# Patient Record
Sex: MALE | Race: ASIAN | NOT HISPANIC OR LATINO | URBAN - METROPOLITAN AREA
[De-identification: names, ages, dates, MRNs, and addresses within clinical notes are randomized per-mention and may not be internally consistent; named-entity substitution may affect disease eponyms.]

---

## 2018-09-06 ENCOUNTER — EMERGENCY (EMERGENCY)
Facility: HOSPITAL | Age: 23
LOS: 1 days | Discharge: HOME | End: 2018-09-06
Attending: EMERGENCY MEDICINE | Admitting: EMERGENCY MEDICINE
Payer: MEDICAID

## 2018-09-06 VITALS
RESPIRATION RATE: 18 BRPM | SYSTOLIC BLOOD PRESSURE: 137 MMHG | HEART RATE: 89 BPM | TEMPERATURE: 98 F | DIASTOLIC BLOOD PRESSURE: 67 MMHG

## 2018-09-06 PROCEDURE — 99283 EMERGENCY DEPT VISIT LOW MDM: CPT

## 2018-09-06 RX ORDER — DIPHENHYDRAMINE HCL 50 MG
50 CAPSULE ORAL ONCE
Qty: 0 | Refills: 0 | Status: COMPLETED | OUTPATIENT
Start: 2018-09-06 | End: 2018-09-06

## 2018-09-06 RX ORDER — DIPHENHYDRAMINE HCL 50 MG
1 CAPSULE ORAL
Qty: 15 | Refills: 0 | OUTPATIENT
Start: 2018-09-06 | End: 2018-09-10

## 2018-09-06 RX ORDER — DIPHENHYDRAMINE HCL 50 MG
1 CAPSULE ORAL
Qty: 15 | Refills: 0
Start: 2018-09-06 | End: 2018-09-10

## 2018-09-06 RX ADMIN — Medication 40 MILLIGRAM(S): at 14:32

## 2018-09-06 RX ADMIN — Medication 50 MILLIGRAM(S): at 14:32

## 2018-09-06 NOTE — ED PROVIDER NOTE - OBJECTIVE STATEMENT
23 yr old male with no sig PMH presents with hives.  Described as pruritic.  Started yesterday.  Denies throat swelling.

## 2018-09-06 NOTE — ED PROVIDER NOTE - MEDICAL DECISION MAKING DETAILS
no throat manifestations; slightly improved on benadryl and steroids; will send on same; offending agent not clear from hx

## 2020-10-30 NOTE — ED ADULT TRIAGE NOTE - HEART RATE (BEATS/MIN)
Patient Seen in: [facility]      History   Patient presents with:  Cough    Stated Complaint: RX REFILL    HPI  Patient presents stating he has been working outside building a house and he is having a mild cough.   No chest pain or shortness o Mouth/Throat:      Mouth: Mucous membranes are moist.      Pharynx: Oropharynx is clear. Eyes:      Conjunctiva/sclera: Conjunctivae normal.   Neck:      Musculoskeletal: Normal range of motion and neck supple.    Cardiovascular:      Rate and Rhythm: N Base) MCG/ACT Inhalation Aero Soln  Inhale 2 puffs into the lungs every 4 (four) hours as needed for Wheezing. Use with your spacer  Qty: 1 Inhaler Refills: 0  Comments: Please dispense with a spacer and teaching if patient does not have a spacer.     benzo 89

## 2023-02-13 ENCOUNTER — INPATIENT (INPATIENT)
Facility: HOSPITAL | Age: 28
LOS: 1 days | Discharge: HOME | End: 2023-02-15
Attending: HOSPITALIST | Admitting: HOSPITALIST
Payer: MEDICAID

## 2023-02-13 VITALS
DIASTOLIC BLOOD PRESSURE: 99 MMHG | OXYGEN SATURATION: 100 % | RESPIRATION RATE: 18 BRPM | SYSTOLIC BLOOD PRESSURE: 141 MMHG | TEMPERATURE: 98 F | HEART RATE: 89 BPM

## 2023-02-13 DIAGNOSIS — Z29.9 ENCOUNTER FOR PROPHYLACTIC MEASURES, UNSPECIFIED: ICD-10-CM

## 2023-02-13 DIAGNOSIS — K85.90 ACUTE PANCREATITIS WITHOUT NECROSIS OR INFECTION, UNSPECIFIED: ICD-10-CM

## 2023-02-13 DIAGNOSIS — D72.829 ELEVATED WHITE BLOOD CELL COUNT, UNSPECIFIED: ICD-10-CM

## 2023-02-13 LAB
ALBUMIN SERPL ELPH-MCNC: 3.8 G/DL — SIGNIFICANT CHANGE UP (ref 3.3–5)
ALBUMIN SERPL ELPH-MCNC: 4.6 G/DL — SIGNIFICANT CHANGE UP (ref 3.3–5)
ALP SERPL-CCNC: 69 U/L — SIGNIFICANT CHANGE UP (ref 40–120)
ALP SERPL-CCNC: 81 U/L — SIGNIFICANT CHANGE UP (ref 40–120)
ALT FLD-CCNC: 31 U/L — SIGNIFICANT CHANGE UP (ref 4–41)
ALT FLD-CCNC: 39 U/L — SIGNIFICANT CHANGE UP (ref 4–41)
ANION GAP SERPL CALC-SCNC: 12 MMOL/L — SIGNIFICANT CHANGE UP (ref 7–14)
ANION GAP SERPL CALC-SCNC: 9 MMOL/L — SIGNIFICANT CHANGE UP (ref 7–14)
AST SERPL-CCNC: 17 U/L — SIGNIFICANT CHANGE UP (ref 4–40)
AST SERPL-CCNC: 20 U/L — SIGNIFICANT CHANGE UP (ref 4–40)
BASE EXCESS BLDV CALC-SCNC: 4.3 MMOL/L — HIGH (ref -2–3)
BASOPHILS # BLD AUTO: 0.04 K/UL — SIGNIFICANT CHANGE UP (ref 0–0.2)
BASOPHILS # BLD AUTO: 0.04 K/UL — SIGNIFICANT CHANGE UP (ref 0–0.2)
BASOPHILS NFR BLD AUTO: 0.3 % — SIGNIFICANT CHANGE UP (ref 0–2)
BASOPHILS NFR BLD AUTO: 0.3 % — SIGNIFICANT CHANGE UP (ref 0–2)
BILIRUB SERPL-MCNC: 0.7 MG/DL — SIGNIFICANT CHANGE UP (ref 0.2–1.2)
BILIRUB SERPL-MCNC: 0.7 MG/DL — SIGNIFICANT CHANGE UP (ref 0.2–1.2)
BLOOD GAS VENOUS COMPREHENSIVE RESULT: SIGNIFICANT CHANGE UP
BUN SERPL-MCNC: 11 MG/DL — SIGNIFICANT CHANGE UP (ref 7–23)
BUN SERPL-MCNC: 9 MG/DL — SIGNIFICANT CHANGE UP (ref 7–23)
CALCIUM SERPL-MCNC: 8.5 MG/DL — SIGNIFICANT CHANGE UP (ref 8.4–10.5)
CALCIUM SERPL-MCNC: 9.6 MG/DL — SIGNIFICANT CHANGE UP (ref 8.4–10.5)
CHLORIDE BLDV-SCNC: 100 MMOL/L — SIGNIFICANT CHANGE UP (ref 96–108)
CHLORIDE SERPL-SCNC: 101 MMOL/L — SIGNIFICANT CHANGE UP (ref 98–107)
CHLORIDE SERPL-SCNC: 97 MMOL/L — LOW (ref 98–107)
CO2 BLDV-SCNC: 30.5 MMOL/L — HIGH (ref 22–26)
CO2 SERPL-SCNC: 25 MMOL/L — SIGNIFICANT CHANGE UP (ref 22–31)
CO2 SERPL-SCNC: 26 MMOL/L — SIGNIFICANT CHANGE UP (ref 22–31)
CREAT SERPL-MCNC: 0.79 MG/DL — SIGNIFICANT CHANGE UP (ref 0.5–1.3)
CREAT SERPL-MCNC: 0.91 MG/DL — SIGNIFICANT CHANGE UP (ref 0.5–1.3)
EGFR: 118 ML/MIN/1.73M2 — SIGNIFICANT CHANGE UP
EGFR: 124 ML/MIN/1.73M2 — SIGNIFICANT CHANGE UP
EOSINOPHIL # BLD AUTO: 0.01 K/UL — SIGNIFICANT CHANGE UP (ref 0–0.5)
EOSINOPHIL # BLD AUTO: 0.03 K/UL — SIGNIFICANT CHANGE UP (ref 0–0.5)
EOSINOPHIL NFR BLD AUTO: 0.1 % — SIGNIFICANT CHANGE UP (ref 0–6)
EOSINOPHIL NFR BLD AUTO: 0.2 % — SIGNIFICANT CHANGE UP (ref 0–6)
FLUAV AG NPH QL: SIGNIFICANT CHANGE UP
FLUBV AG NPH QL: SIGNIFICANT CHANGE UP
GAS PNL BLDV: 134 MMOL/L — LOW (ref 136–145)
GAS PNL BLDV: SIGNIFICANT CHANGE UP
GLUCOSE BLDV-MCNC: 148 MG/DL — HIGH (ref 70–99)
GLUCOSE SERPL-MCNC: 137 MG/DL — HIGH (ref 70–99)
GLUCOSE SERPL-MCNC: 145 MG/DL — HIGH (ref 70–99)
HCO3 BLDV-SCNC: 29 MMOL/L — SIGNIFICANT CHANGE UP (ref 22–29)
HCT VFR BLD CALC: 44.8 % — SIGNIFICANT CHANGE UP (ref 39–50)
HCT VFR BLD CALC: 49.2 % — SIGNIFICANT CHANGE UP (ref 39–50)
HCT VFR BLDA CALC: 51 % — SIGNIFICANT CHANGE UP (ref 39–51)
HGB BLD CALC-MCNC: 16.9 G/DL — SIGNIFICANT CHANGE UP (ref 12.6–17.4)
HGB BLD-MCNC: 14.9 G/DL — SIGNIFICANT CHANGE UP (ref 13–17)
HGB BLD-MCNC: 16.6 G/DL — SIGNIFICANT CHANGE UP (ref 13–17)
IANC: 10 K/UL — HIGH (ref 1.8–7.4)
IANC: 9.59 K/UL — HIGH (ref 1.8–7.4)
IMM GRANULOCYTES NFR BLD AUTO: 0.4 % — SIGNIFICANT CHANGE UP (ref 0–0.9)
IMM GRANULOCYTES NFR BLD AUTO: 0.4 % — SIGNIFICANT CHANGE UP (ref 0–0.9)
LACTATE BLDV-MCNC: 1.9 MMOL/L — SIGNIFICANT CHANGE UP (ref 0.5–2)
LIDOCAIN IGE QN: >3000 U/L — HIGH (ref 7–60)
LYMPHOCYTES # BLD AUTO: 1.5 K/UL — SIGNIFICANT CHANGE UP (ref 1–3.3)
LYMPHOCYTES # BLD AUTO: 12.1 % — LOW (ref 13–44)
LYMPHOCYTES # BLD AUTO: 18 % — SIGNIFICANT CHANGE UP (ref 13–44)
LYMPHOCYTES # BLD AUTO: 2.29 K/UL — SIGNIFICANT CHANGE UP (ref 1–3.3)
MAGNESIUM SERPL-MCNC: 1.6 MG/DL — SIGNIFICANT CHANGE UP (ref 1.6–2.6)
MCHC RBC-ENTMCNC: 29 PG — SIGNIFICANT CHANGE UP (ref 27–34)
MCHC RBC-ENTMCNC: 29.3 PG — SIGNIFICANT CHANGE UP (ref 27–34)
MCHC RBC-ENTMCNC: 33.3 GM/DL — SIGNIFICANT CHANGE UP (ref 32–36)
MCHC RBC-ENTMCNC: 33.7 GM/DL — SIGNIFICANT CHANGE UP (ref 32–36)
MCV RBC AUTO: 86.9 FL — SIGNIFICANT CHANGE UP (ref 80–100)
MCV RBC AUTO: 87.2 FL — SIGNIFICANT CHANGE UP (ref 80–100)
MONOCYTES # BLD AUTO: 0.69 K/UL — SIGNIFICANT CHANGE UP (ref 0–0.9)
MONOCYTES # BLD AUTO: 0.82 K/UL — SIGNIFICANT CHANGE UP (ref 0–0.9)
MONOCYTES NFR BLD AUTO: 5.4 % — SIGNIFICANT CHANGE UP (ref 2–14)
MONOCYTES NFR BLD AUTO: 6.6 % — SIGNIFICANT CHANGE UP (ref 2–14)
NEUTROPHILS # BLD AUTO: 10 K/UL — HIGH (ref 1.8–7.4)
NEUTROPHILS # BLD AUTO: 9.59 K/UL — HIGH (ref 1.8–7.4)
NEUTROPHILS NFR BLD AUTO: 75.7 % — SIGNIFICANT CHANGE UP (ref 43–77)
NEUTROPHILS NFR BLD AUTO: 80.5 % — HIGH (ref 43–77)
NRBC # BLD: 0 /100 WBCS — SIGNIFICANT CHANGE UP (ref 0–0)
NRBC # BLD: 0 /100 WBCS — SIGNIFICANT CHANGE UP (ref 0–0)
NRBC # FLD: 0 K/UL — SIGNIFICANT CHANGE UP (ref 0–0)
NRBC # FLD: 0 K/UL — SIGNIFICANT CHANGE UP (ref 0–0)
PCO2 BLDV: 43 MMHG — SIGNIFICANT CHANGE UP (ref 42–55)
PH BLDV: 7.44 — HIGH (ref 7.32–7.43)
PHOSPHATE SERPL-MCNC: 3.2 MG/DL — SIGNIFICANT CHANGE UP (ref 2.5–4.5)
PLATELET # BLD AUTO: 346 K/UL — SIGNIFICANT CHANGE UP (ref 150–400)
PLATELET # BLD AUTO: 384 K/UL — SIGNIFICANT CHANGE UP (ref 150–400)
PO2 BLDV: 26 MMHG — SIGNIFICANT CHANGE UP (ref 25–45)
POTASSIUM BLDV-SCNC: 3.8 MMOL/L — SIGNIFICANT CHANGE UP (ref 3.5–5.1)
POTASSIUM SERPL-MCNC: 3.8 MMOL/L — SIGNIFICANT CHANGE UP (ref 3.5–5.3)
POTASSIUM SERPL-MCNC: 3.8 MMOL/L — SIGNIFICANT CHANGE UP (ref 3.5–5.3)
POTASSIUM SERPL-SCNC: 3.8 MMOL/L — SIGNIFICANT CHANGE UP (ref 3.5–5.3)
POTASSIUM SERPL-SCNC: 3.8 MMOL/L — SIGNIFICANT CHANGE UP (ref 3.5–5.3)
PROT SERPL-MCNC: 6.7 G/DL — SIGNIFICANT CHANGE UP (ref 6–8.3)
PROT SERPL-MCNC: 7.9 G/DL — SIGNIFICANT CHANGE UP (ref 6–8.3)
RBC # BLD: 5.14 M/UL — SIGNIFICANT CHANGE UP (ref 4.2–5.8)
RBC # BLD: 5.66 M/UL — SIGNIFICANT CHANGE UP (ref 4.2–5.8)
RBC # FLD: 12.6 % — SIGNIFICANT CHANGE UP (ref 10.3–14.5)
RBC # FLD: 12.9 % — SIGNIFICANT CHANGE UP (ref 10.3–14.5)
RSV RNA NPH QL NAA+NON-PROBE: SIGNIFICANT CHANGE UP
SAO2 % BLDV: 40.4 % — LOW (ref 67–88)
SARS-COV-2 RNA SPEC QL NAA+PROBE: DETECTED
SODIUM SERPL-SCNC: 135 MMOL/L — SIGNIFICANT CHANGE UP (ref 135–145)
SODIUM SERPL-SCNC: 135 MMOL/L — SIGNIFICANT CHANGE UP (ref 135–145)
WBC # BLD: 12.42 K/UL — HIGH (ref 3.8–10.5)
WBC # BLD: 12.69 K/UL — HIGH (ref 3.8–10.5)
WBC # FLD AUTO: 12.42 K/UL — HIGH (ref 3.8–10.5)
WBC # FLD AUTO: 12.69 K/UL — HIGH (ref 3.8–10.5)

## 2023-02-13 PROCEDURE — 12345: CPT | Mod: NC

## 2023-02-13 PROCEDURE — 76705 ECHO EXAM OF ABDOMEN: CPT | Mod: 26

## 2023-02-13 PROCEDURE — 99222 1ST HOSP IP/OBS MODERATE 55: CPT

## 2023-02-13 PROCEDURE — 99285 EMERGENCY DEPT VISIT HI MDM: CPT

## 2023-02-13 PROCEDURE — 99223 1ST HOSP IP/OBS HIGH 75: CPT

## 2023-02-13 PROCEDURE — 74177 CT ABD & PELVIS W/CONTRAST: CPT | Mod: 26

## 2023-02-13 RX ORDER — SODIUM CHLORIDE 9 MG/ML
1000 INJECTION, SOLUTION INTRAVENOUS
Refills: 0 | Status: DISCONTINUED | OUTPATIENT
Start: 2023-02-13 | End: 2023-02-15

## 2023-02-13 RX ORDER — LIDOCAINE 4 G/100G
10 CREAM TOPICAL ONCE
Refills: 0 | Status: COMPLETED | OUTPATIENT
Start: 2023-02-13 | End: 2023-02-13

## 2023-02-13 RX ORDER — SODIUM CHLORIDE 9 MG/ML
1000 INJECTION INTRAMUSCULAR; INTRAVENOUS; SUBCUTANEOUS ONCE
Refills: 0 | Status: COMPLETED | OUTPATIENT
Start: 2023-02-13 | End: 2023-02-13

## 2023-02-13 RX ORDER — FAMOTIDINE 10 MG/ML
20 INJECTION INTRAVENOUS ONCE
Refills: 0 | Status: COMPLETED | OUTPATIENT
Start: 2023-02-13 | End: 2023-02-13

## 2023-02-13 RX ORDER — ONDANSETRON 8 MG/1
4 TABLET, FILM COATED ORAL ONCE
Refills: 0 | Status: COMPLETED | OUTPATIENT
Start: 2023-02-13 | End: 2023-02-13

## 2023-02-13 RX ORDER — HYDROMORPHONE HYDROCHLORIDE 2 MG/ML
1 INJECTION INTRAMUSCULAR; INTRAVENOUS; SUBCUTANEOUS EVERY 4 HOURS
Refills: 0 | Status: DISCONTINUED | OUTPATIENT
Start: 2023-02-13 | End: 2023-02-15

## 2023-02-13 RX ORDER — ACETAMINOPHEN 500 MG
1000 TABLET ORAL ONCE
Refills: 0 | Status: COMPLETED | OUTPATIENT
Start: 2023-02-13 | End: 2023-02-13

## 2023-02-13 RX ORDER — HYDROMORPHONE HYDROCHLORIDE 2 MG/ML
1 INJECTION INTRAMUSCULAR; INTRAVENOUS; SUBCUTANEOUS ONCE
Refills: 0 | Status: DISCONTINUED | OUTPATIENT
Start: 2023-02-13 | End: 2023-02-13

## 2023-02-13 RX ORDER — MAGNESIUM SULFATE 500 MG/ML
2 VIAL (ML) INJECTION ONCE
Refills: 0 | Status: COMPLETED | OUTPATIENT
Start: 2023-02-13 | End: 2023-02-13

## 2023-02-13 RX ORDER — MORPHINE SULFATE 50 MG/1
4 CAPSULE, EXTENDED RELEASE ORAL ONCE
Refills: 0 | Status: DISCONTINUED | OUTPATIENT
Start: 2023-02-13 | End: 2023-02-13

## 2023-02-13 RX ORDER — HYDROMORPHONE HYDROCHLORIDE 2 MG/ML
2 INJECTION INTRAMUSCULAR; INTRAVENOUS; SUBCUTANEOUS EVERY 4 HOURS
Refills: 0 | Status: DISCONTINUED | OUTPATIENT
Start: 2023-02-13 | End: 2023-02-15

## 2023-02-13 RX ADMIN — HYDROMORPHONE HYDROCHLORIDE 2 MILLIGRAM(S): 2 INJECTION INTRAMUSCULAR; INTRAVENOUS; SUBCUTANEOUS at 10:19

## 2023-02-13 RX ADMIN — MORPHINE SULFATE 4 MILLIGRAM(S): 50 CAPSULE, EXTENDED RELEASE ORAL at 02:14

## 2023-02-13 RX ADMIN — SODIUM CHLORIDE 150 MILLILITER(S): 9 INJECTION, SOLUTION INTRAVENOUS at 05:11

## 2023-02-13 RX ADMIN — MORPHINE SULFATE 4 MILLIGRAM(S): 50 CAPSULE, EXTENDED RELEASE ORAL at 04:05

## 2023-02-13 RX ADMIN — LIDOCAINE 10 MILLILITER(S): 4 CREAM TOPICAL at 05:35

## 2023-02-13 RX ADMIN — SODIUM CHLORIDE 1000 MILLILITER(S): 9 INJECTION INTRAMUSCULAR; INTRAVENOUS; SUBCUTANEOUS at 02:56

## 2023-02-13 RX ADMIN — SODIUM CHLORIDE 150 MILLILITER(S): 9 INJECTION, SOLUTION INTRAVENOUS at 10:26

## 2023-02-13 RX ADMIN — ONDANSETRON 4 MILLIGRAM(S): 8 TABLET, FILM COATED ORAL at 01:16

## 2023-02-13 RX ADMIN — Medication 400 MILLIGRAM(S): at 01:16

## 2023-02-13 RX ADMIN — HYDROMORPHONE HYDROCHLORIDE 2 MILLIGRAM(S): 2 INJECTION INTRAMUSCULAR; INTRAVENOUS; SUBCUTANEOUS at 23:01

## 2023-02-13 RX ADMIN — HYDROMORPHONE HYDROCHLORIDE 2 MILLIGRAM(S): 2 INJECTION INTRAMUSCULAR; INTRAVENOUS; SUBCUTANEOUS at 16:08

## 2023-02-13 RX ADMIN — SODIUM CHLORIDE 1000 MILLILITER(S): 9 INJECTION INTRAMUSCULAR; INTRAVENOUS; SUBCUTANEOUS at 01:16

## 2023-02-13 RX ADMIN — HYDROMORPHONE HYDROCHLORIDE 2 MILLIGRAM(S): 2 INJECTION INTRAMUSCULAR; INTRAVENOUS; SUBCUTANEOUS at 15:51

## 2023-02-13 RX ADMIN — Medication 25 GRAM(S): at 10:24

## 2023-02-13 RX ADMIN — HYDROMORPHONE HYDROCHLORIDE 1 MILLIGRAM(S): 2 INJECTION INTRAMUSCULAR; INTRAVENOUS; SUBCUTANEOUS at 05:30

## 2023-02-13 RX ADMIN — HYDROMORPHONE HYDROCHLORIDE 1 MILLIGRAM(S): 2 INJECTION INTRAMUSCULAR; INTRAVENOUS; SUBCUTANEOUS at 05:11

## 2023-02-13 RX ADMIN — HYDROMORPHONE HYDROCHLORIDE 2 MILLIGRAM(S): 2 INJECTION INTRAMUSCULAR; INTRAVENOUS; SUBCUTANEOUS at 23:40

## 2023-02-13 RX ADMIN — FAMOTIDINE 20 MILLIGRAM(S): 10 INJECTION INTRAVENOUS at 01:16

## 2023-02-13 RX ADMIN — HYDROMORPHONE HYDROCHLORIDE 2 MILLIGRAM(S): 2 INJECTION INTRAMUSCULAR; INTRAVENOUS; SUBCUTANEOUS at 10:34

## 2023-02-13 NOTE — PROGRESS NOTE ADULT - SUBJECTIVE AND OBJECTIVE BOX
Patient is a 28y old  Male who presents with a chief complaint of Pancreatitis (13 Feb 2023 08:08)      SUBJECTIVE / OVERNIGHT EVENTS:    MEDICATIONS  (STANDING):  lactated ringers. 1000 milliLiter(s) (150 mL/Hr) IV Continuous <Continuous>  magnesium sulfate  IVPB 2 Gram(s) IV Intermittent once    MEDICATIONS  (PRN):  HYDROmorphone  Injectable 1 milliGRAM(s) IV Push every 4 hours PRN Moderate Pain (4 - 6)  HYDROmorphone  Injectable 2 milliGRAM(s) IV Push every 4 hours PRN Severe Pain (7 - 10)      Vital Signs Last 24 Hrs  T(C): 36.9 (13 Feb 2023 06:38), Max: 36.9 (13 Feb 2023 06:38)  T(F): 98.5 (13 Feb 2023 06:38), Max: 98.5 (13 Feb 2023 06:38)  HR: 65 (13 Feb 2023 06:38) (65 - 89)  BP: 126/86 (13 Feb 2023 06:38) (126/86 - 141/99)  BP(mean): --  RR: 16 (13 Feb 2023 06:38) (15 - 18)  SpO2: 98% (13 Feb 2023 06:38) (98% - 100%)    Parameters below as of 13 Feb 2023 06:38  Patient On (Oxygen Delivery Method): room air      CAPILLARY BLOOD GLUCOSE        I&O's Summary      PHYSICAL EXAM:  GENERAL: NAD, well-developed  HEAD:  Atraumatic, Normocephalic  EYES: EOMI, PERRLA, conjunctiva and sclera clear  NECK: Supple, No JVD  CHEST/LUNG: Clear to auscultation bilaterally; No wheeze  HEART: Regular rate and rhythm; No murmurs, rubs, or gallops  ABDOMEN: Soft, Nontender, Nondistended; Bowel sounds present  EXTREMITIES:  2+ Peripheral Pulses, No clubbing, cyanosis, or edema  PSYCH: AAOx3  NEUROLOGY: non-focal  SKIN: No rashes or lesions    LABS:                        14.9   12.42 )-----------( 346      ( 13 Feb 2023 05:50 )             44.8     02-13    135  |  101  |  9   ----------------------------<  137<H>  3.8   |  25  |  0.79    Ca    8.5      13 Feb 2023 05:50  Phos  3.2     02-13  Mg     1.60     02-13    TPro  6.7  /  Alb  3.8  /  TBili  0.7  /  DBili  x   /  AST  17  /  ALT  31  /  AlkPhos  69  02-13              RADIOLOGY & ADDITIONAL TESTS:    Imaging Personally Reviewed:    Consultant(s) Notes Reviewed:      Care Discussed with Consultants/Other Providers:

## 2023-02-13 NOTE — H&P ADULT - NSHPPHYSICALEXAM_GEN_ALL_CORE
Vital Signs Last 24 Hrs  T(C): 36.7 (13 Feb 2023 00:18), Max: 36.7 (13 Feb 2023 00:18)  T(F): 98 (13 Feb 2023 00:18), Max: 98 (13 Feb 2023 00:18)  HR: 87 (13 Feb 2023 03:24) (87 - 89)  BP: 139/89 (13 Feb 2023 03:24) (139/89 - 141/99)  BP(mean): --  RR: 15 (13 Feb 2023 03:24) (15 - 18)  SpO2: 100% (13 Feb 2023 03:24) (100% - 100%)    Parameters below as of 13 Feb 2023 03:24  Patient On (Oxygen Delivery Method): room air        CONSTITUTIONAL: Uncomfortable; holding ABD   HEENT: clear conjunctiva  RESPIRATORY: Normal respiratory effort; lungs are clear to auscultation bilaterally; No Crackles/Rhonchi/Wheezing  CARDIOVASCULAR: Regular rate and rhythm, normal S1 and S2, no murmur/rub/gallop; No lower extremity edema; Peripheral pulses are 2+ bilaterally  ABDOMEN: Tender to Palpation to epigastric area; normoactive bowel sounds, no rebound/guarding;   MUSCULOSKELETAL: no clubbing or cyanosis of digits; no joint swelling or tenderness to palpation  EXTREMITY: Lower extremities Non-tender to palpation; non-erythematous B/L  NEURO: A&Ox3; no focal deficits   PSYCH: normal mood; Affect appropirate

## 2023-02-13 NOTE — ED PROVIDER NOTE - CLINICAL SUMMARY MEDICAL DECISION MAKING FREE TEXT BOX
29 y/o M no reported pmhx p/w sudden onset epigastric pain x 4 hours PTA.   Gastritis vs pancreatitis  plan  - labs  - ivf  - pain control  - reassess

## 2023-02-13 NOTE — ED ADULT NURSE NOTE - OBJECTIVE STATEMENT
29y/o M who denies any PMHx presents to ED with c/o mid abdominal pain, nausea, and multiple episodes of NBNB emesis tonight after eating [employer]. Denies fevers, chills, urinary complaints, diarrhea, constipation, CP, SOB. IV access established. Labs collected and sent. Medicated & given IV fluids as per provider order.

## 2023-02-13 NOTE — PROGRESS NOTE ADULT - PROBLEM SELECTOR PLAN 1
Acute pancreatitis since 9pm today   Lipase > 3000  NBNB emesis x4 today   - IVF   - Pain control with Hydromorphone 1 PRN q4 for mod, Hydromorphone 2 PRN for severe   - Npo for now, advance as tolerable   - Anti-emetics PRN (Check QTc)  - F/U CT A/P   - F/U RUQ  - Consider Auto-immune Pancreatitis as this is his second episode of pancreatitis/similiar presentation as Dx: Will emai lGI c/s

## 2023-02-13 NOTE — H&P ADULT - PROBLEM SELECTOR PLAN 1
Acute pancreatitis since 9pm today   Lipase > 3000  NBNB emesis x4 today   - IVF   - Pain control with Hydromorphone 1 PRN q4 for severe, Hydromorphone 0.5 PRN q4 for mod   - Bowel regimen  - Npo for now, advance as tolerable   - Anti-emetics PRN (Check QTc)  - F/U CT A/P   - F/U RUQ Acute pancreatitis since 9pm today   Lipase > 3000  NBNB emesis x4 today   - IVF   - Pain control with Hydromorphone 1 PRN q4 for severe, Hydromorphone 0.5 PRN q4 for mod   - Npo for now, advance as tolerable   - Anti-emetics PRN (Check QTc)  - F/U CT A/P   - F/U RUQ Acute pancreatitis since 9pm today   Lipase > 3000  NBNB emesis x4 today   - IVF   - Pain control with Hydromorphone 1 PRN q4 for mod, Hydromorphone 2 PRN for severe   - Npo for now, advance as tolerable   - Anti-emetics PRN (Check QTc)  - F/U CT A/P   - F/U RUQ  - Consider Auto-immune Pancreatitis as Dx: Will emai lGI c/s Acute pancreatitis since 9pm today   Lipase > 3000  NBNB emesis x4 today   - IVF   - Pain control with Hydromorphone 1 PRN q4 for mod, Hydromorphone 2 PRN for severe   - Npo for now, advance as tolerable   - Anti-emetics PRN (Check QTc)  - F/U CT A/P   - F/U RUQ  - Consider Auto-immune Pancreatitis as this is his second episode of pancreatitis/similiar presentation as Dx: Will emai lGI c/s

## 2023-02-13 NOTE — ED ADULT NURSE REASSESSMENT NOTE - NS ED NURSE REASSESS COMMENT FT1
Pt admitted to medicine for pancreatitis. Pain managed as per provider order. CT scan pending. Safety maintained. No acute distress noted. Pt slotted for ESSU; report provided to ALLEN Shin.
Report given to ESSU 1 ALLEN Lee. NAD noted. respirations even and unlabored on RA. VS as noted.

## 2023-02-13 NOTE — H&P ADULT - NSHPREVIEWOFSYSTEMS_GEN_ALL_CORE
General: Denies dizziness, fatigue  Respiratory: Denies cough, SOB  Cardiovascular: Denies palpitations, CP  Gastrointestinal: + abd pain, +N/V, denies hematochezia, melena , diarrhea   : Denies dysuria,   Neuro: Denies weakness,     All ROS negative unless indicated above

## 2023-02-13 NOTE — ED PROVIDER NOTE - ATTENDING APP SHARED VISIT CONTRIBUTION OF CARE
27 yo male with no PMH for evaluation of epigastric pain since earlier today. +nausea with non-bloody, non-bilious vomiting. PE: abd +epigastric tender to palpation A/P Labs, imaging, medicate, reassess

## 2023-02-13 NOTE — CONSULT NOTE ADULT - NS ATTEND AMEND GEN_ALL_CORE FT
#Acute pancreatitis: Presented with abdominal pain with associated lipase elevation c/w acute pancreatitis. No significant EtOH reported. TG levels normal. US without biliary pathology.   #COVID infection    --Daily PPI  --Pain management per primary team  --NPO for now, can trial clear liquid diet when symptoms improve  --Send IgG4  --PEth testing    Additional recommendations as above.

## 2023-02-13 NOTE — ED PROVIDER NOTE - OBJECTIVE STATEMENT
29 y/o M no reported pmhx p/w sudden onset epigastric pain x 4 hours PTA. Pt reports severe sharp pain. No exacerbating or alleviating factors reports 4 episodes of emesis. Pain started after eating subway and has been worsening since. Pt denies fever, chills, chest pain, sob, cough, diarrhea, headache, dizziness.

## 2023-02-13 NOTE — H&P ADULT - HISTORY OF PRESENT ILLNESS
28M w/ no PMH p/w with acute epigastric abd pain since 9pm. Pt says he had a turkey sandwhich with pper-tyra cheese and chipotle sauce from [employer] and then began having severe ABD pain afterwards with approx 3-4 episodes of NBNB emesis. Pt says he rarely drinks (only socially). He says he has had this happen to him one time in the past at [place] state where he [place] hospitalized for similar presentation. Denies any family hx of hypretryglyciredemia.  28M w/ no PMH p/w with acute epigastric abd pain since 9pm. Pt says he had a turkey sandwhich with pper-tyra cheese and chipotle sauce from [employer] and then began having severe ABD pain afterwards with approx 3-4 episodes of NBNB emesis. Pt says he rarely drinks (only socially). He says he has had this happen to him one time in the past at another state where he was hospitalized for similar presentation. Denies any family hx of hypertriglyceridemia Denies recent fevers, chills, cp, urinary symptoms, diarrhea, constipation, melena, hematochezia, hematemesis     ED: 139/89, HR 89, Afebrile , RA   Received: Morphine 4IVP x2, Pepcid x1, Zofran x1, 2L, placed on LR maintanence

## 2023-02-13 NOTE — H&P ADULT - ATTENDING COMMENTS
I agree with the above H&P from ACP/Resident/Intern. I have personally seen and examined the patient.  I fully participated in the care of this patient.  I have made amendments to the documentation where necessary, and agree with the history, physical exam, and plan as documented by the Resident.  In addition:  HPI: 28M w/ no PMH p/w with acute epigastric abdominal pain since 9pm. Pt says he had a turkey sandwich with pepper-tyra cheese and chipotle sauce from [employer] and then began having severe ABD pain afterwards with approx 3-4 episodes of NBNB emesis. Pt says he rarely drinks (only socially) and has not had any alcohol prior to admission. He says he has had this happen to him one time in the past at [place] state where he was hospitalized for similar presentation but not pancreatitis. Denies any family hx of hypertriglyceridemia, pancreatitis, autoimmune disease. Denies recent fevers, chills, cp, urinary symptoms, diarrhea, constipation, melena, hematochezia, hematemesis     ED: 139/89, HR 89, Afebrile , RA   Received: Morphine 4IVP x2, Pepcid x1, Zofran x1, 2L, placed on LR maintenance. CT A/P pending. Triglyceride and labs otherwise normal.    Labs: Reviewed  Imaging: Reviewed  EKG: Reviewed  PHYSICAL EXAM:  GENERAL: NAD, comfortable appearing  CHEST/LUNG: Clear to auscultation bilaterally; No wheezes, rales or rhonchi  HEART: Regular rate and rhythm; No murmurs, rubs, or gallops, (+)S1, S2  ABDOMEN: Soft, Nontender, Nondistended; Normal Bowel sounds   EXTREMITIES:  2+ Peripheral Pulses, No clubbing, cyanosis, or edema    A/P: 28M w/ no PMH p/w with acute epigastric abdominal pain with elevated lipase now admitted for acute pancreatitis. Unknown/idiopathic pancreatitis concerning for autoimmune due to recurrence. Not hypercalcemic, poisoning, gallstones, etoh. Continue IVF, dilaudid, NPO. F/U CT A/P for biliary assessment.

## 2023-02-13 NOTE — H&P ADULT - NSHPLABSRESULTS_GEN_ALL_CORE
LABS:                        16.6   12.69 )-----------( 384      ( 13 Feb 2023 01:15 )             49.2     02-13    135  |  97<L>  |  11  ----------------------------<  145<H>  3.8   |  26  |  0.91    Ca    9.6      13 Feb 2023 01:15    TPro  7.9  /  Alb  4.6  /  TBili  0.7  /  DBili  x   /  AST  20  /  ALT  39  /  AlkPhos  81  02-13                RADIOLOGY & ADDITIONAL TESTS:  Results Reviewed:   Imaging Personally Reviewed:  Electrocardiogram Personally Reviewed: LABS:                        16.6   12.69 )-----------( 384      ( 13 Feb 2023 01:15 )             49.2     02-13    135  |  97<L>  |  11  ----------------------------<  145<H>  3.8   |  26  |  0.91    Ca    9.6      13 Feb 2023 01:15    TPro  7.9  /  Alb  4.6  /  TBili  0.7  /  DBili  x   /  AST  20  /  ALT  39  /  AlkPhos  81  02-13      RADIOLOGY & ADDITIONAL TESTS:  Results Reviewed:   Imaging Personally Reviewed:  Electrocardiogram Personally Reviewed:    EKG: Pending    Image: pending CT A/P

## 2023-02-13 NOTE — CONSULT NOTE ADULT - SUBJECTIVE AND OBJECTIVE BOX
Chief Complaint:  Patient is a 28y old  Male who presents with a chief complaint of Pancreatitis (13 Feb 2023 04:27)      HPI:  28M w/ no PMH p/w with acute epigastric abd pain since 9pm. Admitted for acute pancreatitis, GI consulted for further evaluation.    pt seen and examined.    currently avss  wbc 12k  renal fxn wnl  lfts wnl  lipase >3k  tg 59  us and ct pending      Allergies:  No Known Allergies      PMHX/PSHX:  No pertinent past medical history    No significant past surgical history        Family history:  No pertinent family history in first degree relatives        Social History: as above    Home Medications:    Hospital Medications:  HYDROmorphone  Injectable 1 milliGRAM(s) IV Push every 4 hours PRN  HYDROmorphone  Injectable 2 milliGRAM(s) IV Push every 4 hours PRN  lactated ringers. 1000 milliLiter(s) IV Continuous <Continuous>        ROS:   General:  AS PER HPI  Eyes:  Adequate vision, no reported pain  ENT:  No sore throat, runny nose, dysphagia  CV:  No chest pain, palpitations  Pulm:  No dyspnea, cough, tachypnea, wheezing  GI:  AS PER HPI  :  No pain, bleeding, burning  Muscle:  No pain, weakness  Neuro:  No tingling, numbness, memory problems  Psych:  No insomnia, mood problems, depression  Endocrine:  No polyuria, cold/heat intolerance  Heme:  No petechiae, ecchymosis, easy bruisability  Skin:  No rash, edema      Vital Signs:  Vital Signs Last 24 Hrs  T(C): 36.9 (13 Feb 2023 06:38), Max: 36.9 (13 Feb 2023 06:38)  T(F): 98.5 (13 Feb 2023 06:38), Max: 98.5 (13 Feb 2023 06:38)  HR: 65 (13 Feb 2023 06:38) (65 - 89)  BP: 126/86 (13 Feb 2023 06:38) (126/86 - 141/99)  BP(mean): --  RR: 16 (13 Feb 2023 06:38) (15 - 18)  SpO2: 98% (13 Feb 2023 06:38) (98% - 100%)    Parameters below as of 13 Feb 2023 06:38  Patient On (Oxygen Delivery Method): room air      Daily     Daily     LABS:                        14.9   12.42 )-----------( 346      ( 13 Feb 2023 05:50 )             44.8     Mean Cell Volume: 87.2 fL (02-13-23 @ 05:50)    02-13    135  |  101  |  9   ----------------------------<  137<H>  3.8   |  25  |  0.79    Ca    8.5      13 Feb 2023 05:50  Phos  3.2     02-13  Mg     1.60     02-13    TPro  6.7  /  Alb  3.8  /  TBili  0.7  /  DBili  x   /  AST  17  /  ALT  31  /  AlkPhos  69  02-13    LIVER FUNCTIONS - ( 13 Feb 2023 05:50 )  Alb: 3.8 g/dL / Pro: 6.7 g/dL / ALK PHOS: 69 U/L / ALT: 31 U/L / AST: 17 U/L / GGT: x               Amylase Serum--      Lipase serum>3000       Ammonia--                          14.9   12.42 )-----------( 346      ( 13 Feb 2023 05:50 )             44.8                         16.6   12.69 )-----------( 384      ( 13 Feb 2023 01:15 )             49.2       PHYSICAL EXAM:   GENERAL:  Lying in bed in NAD  HEENT:  Normocephalic/atraumatic, no scleral icterus  NECK: Trachea midline  CHEST:  Resp even, non labored   ABDOMEN:  Soft, non-tender, non-distended  EXTREMITIES: WWP, no edema  SKIN:  No rash or jaundice  NEURO:  Alert and oriented x 3, no asterixis    Imaging:  imaging pending         Chief Complaint:  Patient is a 28y old  Male who presents with a chief complaint of Pancreatitis (13 Feb 2023 04:27)      HPI:  28M w/ no PMH p/w with acute epigastric abd pain since 9pm. Admitted for acute pancreatitis, GI consulted for further evaluation.    pt seen and examined, brother present, assisted w/ hx. pt c/o severe non radiating upper abd pain which started last night around 9pm shortly after eating sandwich from [employer] (turkey, cheese, chipotle sauce). associated nausea and non bloody vomiting. last bm yesterday (no blood). upon eval, denies prior episodes of such pain, prior hospitalizations for abd pain or known prior pancreatitis (despite documentation from initial h&p). denies fevers, chills, hematemesis, diarrhea, constipation, melena, brbpr. has never had egd/colon. no prior abd sx. fhx nc for gi tract live, ibd, pancreatic disorders. meds- no daily meds. reports social etoh use ~2-3x/month, no use recently. upon eval, c/o ongoing severe abd pain.    currently avss  wbc 12k  renal fxn wnl  lfts wnl  lipase >3k  tg 59 ca wnl  us and ct pending      Allergies:  No Known Allergies      PMHX/PSHX:  No pertinent past medical history    No significant past surgical history        Family history:  No pertinent family history in first degree relatives        Social History: as above    Home Medications:    Hospital Medications:  HYDROmorphone  Injectable 1 milliGRAM(s) IV Push every 4 hours PRN  HYDROmorphone  Injectable 2 milliGRAM(s) IV Push every 4 hours PRN  lactated ringers. 1000 milliLiter(s) IV Continuous <Continuous>        ROS:   General:  AS PER HPI  Eyes:  Adequate vision, no reported pain  ENT:  No sore throat, runny nose, dysphagia  CV:  No chest pain, palpitations  Pulm:  No dyspnea, cough, tachypnea, wheezing  GI:  AS PER HPI  :  No pain, bleeding, burning  Muscle:  No pain, weakness  Neuro:  No tingling, numbness, memory problems  Psych:  No insomnia, mood problems, depression  Endocrine:  No polyuria, cold/heat intolerance  Heme:  No petechiae, ecchymosis, easy bruisability  Skin:  No rash, edema      Vital Signs:  Vital Signs Last 24 Hrs  T(C): 36.9 (13 Feb 2023 06:38), Max: 36.9 (13 Feb 2023 06:38)  T(F): 98.5 (13 Feb 2023 06:38), Max: 98.5 (13 Feb 2023 06:38)  HR: 65 (13 Feb 2023 06:38) (65 - 89)  BP: 126/86 (13 Feb 2023 06:38) (126/86 - 141/99)  BP(mean): --  RR: 16 (13 Feb 2023 06:38) (15 - 18)  SpO2: 98% (13 Feb 2023 06:38) (98% - 100%)    Parameters below as of 13 Feb 2023 06:38  Patient On (Oxygen Delivery Method): room air      Daily     Daily     LABS:                        14.9   12.42 )-----------( 346      ( 13 Feb 2023 05:50 )             44.8     Mean Cell Volume: 87.2 fL (02-13-23 @ 05:50)    02-13    135  |  101  |  9   ----------------------------<  137<H>  3.8   |  25  |  0.79    Ca    8.5      13 Feb 2023 05:50  Phos  3.2     02-13  Mg     1.60     02-13    TPro  6.7  /  Alb  3.8  /  TBili  0.7  /  DBili  x   /  AST  17  /  ALT  31  /  AlkPhos  69  02-13    LIVER FUNCTIONS - ( 13 Feb 2023 05:50 )  Alb: 3.8 g/dL / Pro: 6.7 g/dL / ALK PHOS: 69 U/L / ALT: 31 U/L / AST: 17 U/L / GGT: x               Amylase Serum--      Lipase serum>3000       Ammonia--                          14.9   12.42 )-----------( 346      ( 13 Feb 2023 05:50 )             44.8                         16.6   12.69 )-----------( 384      ( 13 Feb 2023 01:15 )             49.2       PHYSICAL EXAM:   GENERAL: lying in bed, well-nourished, uncomfortable appearing  HEENT: NCAT, oropharynx clear, no mucosal ulcerations appreciated  EYES: anicteric sclerae, moist conjunctivae  NECK: trachea midline, FROM, neck supple  CHEST:  respirations even, non labored  ABDOMEN:  soft, ttp b/l upper quadrants most prominent epigastric region wo rt/guarding,  non-distended  EXTREMITIES: WWP, no edema  SKIN:  warm/dry, no visible rash appreciated  PSYCH: A&O x 3  NEURO: no tremor or asterixis noted     Imaging: pending         Chief Complaint:  Patient is a 28y old  Male who presents with a chief complaint of Pancreatitis (13 Feb 2023 04:27)      HPI:  28M w/ no PMH p/w with acute epigastric abd pain since 9pm. Admitted for acute pancreatitis, GI consulted for further evaluation.    pt seen and examined, brother present, assisted w/ hx. pt c/o severe non radiating upper abd pain which started last night around 9pm shortly after eating sandwich from [employer] (turkey, cheese, chipotle sauce). associated nausea and non bloody vomiting. last bm yesterday (no blood). upon eval, denies prior episodes of such pain, prior hospitalizations for abd pain or known prior pancreatitis (despite documentation from initial h&p). denies fevers, chills, hematemesis, diarrhea, constipation, melena, brbpr. has never had egd/colon. no prior abd sx. fhx nc for gi tract ilve, ibd, pancreatic disorders. meds- no daily meds. reports social etoh use ~2-3x/month, no use recently. upon eval, c/o ongoing severe abd pain.    currently avss  wbc 12k  renal fxn wnl  lfts wnl  lipase >3k  tg 59 ca wnl  us and ct pending  covid +      Allergies:  No Known Allergies      PMHX/PSHX:  No pertinent past medical history    No significant past surgical history        Family history:  No pertinent family history in first degree relatives        Social History: as above    Home Medications:    Hospital Medications:  HYDROmorphone  Injectable 1 milliGRAM(s) IV Push every 4 hours PRN  HYDROmorphone  Injectable 2 milliGRAM(s) IV Push every 4 hours PRN  lactated ringers. 1000 milliLiter(s) IV Continuous <Continuous>        ROS:   General:  AS PER HPI  Eyes:  Adequate vision, no reported pain  ENT:  No sore throat, runny nose, dysphagia  CV:  No chest pain, palpitations  Pulm:  No dyspnea, cough, tachypnea, wheezing  GI:  AS PER HPI  :  No pain, bleeding, burning  Muscle:  No pain, weakness  Neuro:  No tingling, numbness, memory problems  Psych:  No insomnia, mood problems, depression  Endocrine:  No polyuria, cold/heat intolerance  Heme:  No petechiae, ecchymosis, easy bruisability  Skin:  No rash, edema      Vital Signs:  Vital Signs Last 24 Hrs  T(C): 36.9 (13 Feb 2023 06:38), Max: 36.9 (13 Feb 2023 06:38)  T(F): 98.5 (13 Feb 2023 06:38), Max: 98.5 (13 Feb 2023 06:38)  HR: 65 (13 Feb 2023 06:38) (65 - 89)  BP: 126/86 (13 Feb 2023 06:38) (126/86 - 141/99)  BP(mean): --  RR: 16 (13 Feb 2023 06:38) (15 - 18)  SpO2: 98% (13 Feb 2023 06:38) (98% - 100%)    Parameters below as of 13 Feb 2023 06:38  Patient On (Oxygen Delivery Method): room air      Daily     Daily     LABS:                        14.9   12.42 )-----------( 346      ( 13 Feb 2023 05:50 )             44.8     Mean Cell Volume: 87.2 fL (02-13-23 @ 05:50)    02-13    135  |  101  |  9   ----------------------------<  137<H>  3.8   |  25  |  0.79    Ca    8.5      13 Feb 2023 05:50  Phos  3.2     02-13  Mg     1.60     02-13    TPro  6.7  /  Alb  3.8  /  TBili  0.7  /  DBili  x   /  AST  17  /  ALT  31  /  AlkPhos  69  02-13    LIVER FUNCTIONS - ( 13 Feb 2023 05:50 )  Alb: 3.8 g/dL / Pro: 6.7 g/dL / ALK PHOS: 69 U/L / ALT: 31 U/L / AST: 17 U/L / GGT: x               Amylase Serum--      Lipase serum>3000       Ammonia--                          14.9   12.42 )-----------( 346      ( 13 Feb 2023 05:50 )             44.8                         16.6   12.69 )-----------( 384      ( 13 Feb 2023 01:15 )             49.2       PHYSICAL EXAM:   GENERAL: lying in bed, well-nourished, uncomfortable appearing  HEENT: NCAT, oropharynx clear, no mucosal ulcerations appreciated  EYES: anicteric sclerae, moist conjunctivae  NECK: trachea midline, FROM, neck supple  CHEST:  respirations even, non labored  ABDOMEN:  soft, ttp b/l upper quadrants most prominent epigastric region wo rt/guarding,  non-distended  EXTREMITIES: WWP, no edema  SKIN:  warm/dry, no visible rash appreciated  PSYCH: A&O x 3  NEURO: no tremor or asterixis noted     Imaging: pending         HPI:  28M w/ no PMH p/w with acute epigastric abd pain since 9pm. Admitted for acute pancreatitis, GI consulted for further evaluation.    pt seen and examined, brother present, assisted w/ hx. pt c/o severe non radiating upper abd pain which started last night around 9pm shortly after eating sandwich from [employer] (turkey, cheese, chipotle sauce). associated nausea and non bloody vomiting. last bm yesterday (no blood). upon eval, denies prior episodes of such pain, prior hospitalizations for abd pain or known prior pancreatitis (despite documentation from initial h&p). denies fevers, chills, hematemesis, diarrhea, constipation, melena, brbpr. has never had egd/colon. no prior abd sx. fhx nc for gi tract live, ibd, pancreatic disorders. meds- no daily meds. reports social etoh use ~2-3x/month, no use recently. upon eval, c/o ongoing severe abd pain.    currently avss  wbc 12k  renal fxn wnl  lfts wnl  lipase >3k  tg 59 ca wnl  us and ct pending  covid +      Allergies:  No Known Allergies      PMHX/PSHX:  No pertinent past medical history    No significant past surgical history        Family history:  No pertinent family history in first degree relatives        Social History: as above    Home Medications:    Hospital Medications:  HYDROmorphone  Injectable 1 milliGRAM(s) IV Push every 4 hours PRN  HYDROmorphone  Injectable 2 milliGRAM(s) IV Push every 4 hours PRN  lactated ringers. 1000 milliLiter(s) IV Continuous <Continuous>        ROS:   General:  AS PER HPI  Eyes:  Adequate vision, no reported pain  ENT:  No sore throat, runny nose, dysphagia  CV:  No chest pain, palpitations  Pulm:  No dyspnea, cough, tachypnea, wheezing  GI:  AS PER HPI  :  No pain, bleeding, burning  Muscle:  No pain, weakness  Neuro:  No tingling, numbness, memory problems  Psych:  No insomnia, mood problems, depression  Endocrine:  No polyuria, cold/heat intolerance  Heme:  No petechiae, ecchymosis, easy bruisability  Skin:  No rash, edema      Vital Signs:  Vital Signs Last 24 Hrs  T(C): 36.9 (13 Feb 2023 06:38), Max: 36.9 (13 Feb 2023 06:38)  T(F): 98.5 (13 Feb 2023 06:38), Max: 98.5 (13 Feb 2023 06:38)  HR: 65 (13 Feb 2023 06:38) (65 - 89)  BP: 126/86 (13 Feb 2023 06:38) (126/86 - 141/99)  BP(mean): --  RR: 16 (13 Feb 2023 06:38) (15 - 18)  SpO2: 98% (13 Feb 2023 06:38) (98% - 100%)    Parameters below as of 13 Feb 2023 06:38  Patient On (Oxygen Delivery Method): room air      Daily     Daily     LABS:                        14.9   12.42 )-----------( 346      ( 13 Feb 2023 05:50 )             44.8     Mean Cell Volume: 87.2 fL (02-13-23 @ 05:50)    02-13    135  |  101  |  9   ----------------------------<  137<H>  3.8   |  25  |  0.79    Ca    8.5      13 Feb 2023 05:50  Phos  3.2     02-13  Mg     1.60     02-13    TPro  6.7  /  Alb  3.8  /  TBili  0.7  /  DBili  x   /  AST  17  /  ALT  31  /  AlkPhos  69  02-13    LIVER FUNCTIONS - ( 13 Feb 2023 05:50 )  Alb: 3.8 g/dL / Pro: 6.7 g/dL / ALK PHOS: 69 U/L / ALT: 31 U/L / AST: 17 U/L / GGT: x               Amylase Serum--      Lipase serum>3000       Ammonia--                          14.9   12.42 )-----------( 346      ( 13 Feb 2023 05:50 )             44.8                         16.6   12.69 )-----------( 384      ( 13 Feb 2023 01:15 )             49.2       PHYSICAL EXAM:   GENERAL: lying in bed, well-nourished, uncomfortable appearing  HEENT: NCAT, oropharynx clear, no mucosal ulcerations appreciated  EYES: anicteric sclerae, moist conjunctivae  NECK: trachea midline, FROM, neck supple  CHEST:  respirations even, non labored  ABDOMEN:  soft, ttp b/l upper quadrants most prominent epigastric region wo rt/guarding,  non-distended  EXTREMITIES: WWP, no edema  SKIN:  warm/dry, no visible rash appreciated  PSYCH: A&O x 3  NEURO: no tremor or asterixis noted     Imaging: pending

## 2023-02-13 NOTE — ED ADULT NURSE NOTE - NSFALLRSKASSESASSIST_ED_ALL_ED
[facility] Care Coordination Contact    Received: POC Sig from member.    Juan Carlos Tavarez  Care Management Specialist  [facility]  [phone]       no

## 2023-02-13 NOTE — PATIENT PROFILE ADULT - FUNCTIONAL ASSESSMENT - BASIC MOBILITY 1.
VM received from [facility] LPN with questions about drainage orders for pt's Pleurx cath. Call returned and spoke with Diallo Perez. Advised of new orders and offered to fax to [facility]. Orders faxed. 4 = No assist / stand by assistance

## 2023-02-13 NOTE — CONSULT NOTE ADULT - ASSESSMENT
28M w/ no reported PMH p/w with acute epigastric abd pain since 9pm last night after eating [employer] sandwich. Admitted for acute pancreatitis, GI consulted for further evaluation.    #acute pancreatitis  #abdominal pain  -p/w severe epigastric pain w/ associated n/v since last night after eating sandwich, initial lipase >3000, mild elevation in wbc, renal fxn normal, tg/lfts/ca wnl, us/ct pending, fhx n/c, no daily meds, social etoh use but denies recent use    Recommendations-  -f/u pending US to eval for possible GS and CTAP  -continue supportive management  -NPO, aggressive IVF resuscitation  -PPI ppx daily  -pain control, anti emetics prn (if qtc ok/no medical CI)   -can check Igg4 if c/f underlying AI etiology  -slowly advance diet as symptoms improve  -rest of care as per primary team    *all recommendations are tentative until note is attested by attending*    MARLENE Mullins PA-C, RD, CDN  available on TEAMS for questions    after 5pm/weekends, please contact the on-call GI fellow at [phone]    28M w/ no reported PMH p/w with acute epigastric abd pain since 9pm last night after eating [employer] sandwich. Admitted for acute pancreatitis, GI consulted for further evaluation.    #acute pancreatitis  #abdominal pain  #covid infection  -p/w severe epigastric pain w/ associated n/v since last night after eating sandwich, initial lipase >3000, mild elevation in wbc, renal fxn normal, tg/lfts/ca wnl, us/ct pending, fhx n/c, no daily meds, social etoh use but denies recent use    Recommendations-  -f/u pending US to eval for possible GS and CTAP  -continue supportive management  -NPO, aggressive IVF resuscitation  -PPI ppx daily  -pain control, anti emetics prn (if qtc ok/no medical CI)   -can check Igg4 if c/f underlying AI etiology  -slowly advance diet as symptoms improve  -rest of care as per primary team    *all recommendations are tentative until note is attested by attending*    MARLENE Mullins PA-C, RD, CDN  available on TEAMS for questions    after 5pm/weekends, please contact the on-call GI fellow at [phone]    28M w/ no reported PMH p/w with acute epigastric abd pain since 9pm last night after eating [employer] sandwich. Admitted for acute pancreatitis, GI consulted for further evaluation.    #acute pancreatitis  #abdominal pain  #covid infection  -p/w severe epigastric pain w/ associated n/v since last night after eating sandwich, initial lipase >3000, mild elevation in wbc, renal fxn normal, tg/lfts/ca wnl, us/ct pending, fhx n/c, no daily meds, social etoh use but denies recent use    Recommendations-  -f/u pending US to eval for possible GS and CTAP  -continue supportive management  -NPO, aggressive IVF resuscitation  -PPI ppx daily  -pain control, anti emetics prn (if qtc ok/no medical CI)   -if US negative for GS can check Igg4 to r/o any underlying AI etiology  -slowly advance diet as symptoms improve  -rest of care as per primary team    *all recommendations are tentative until note is attested by attending*    MARLENE Mullins PA-C, RD, CDN  available on TEAMS for questions    after 5pm/weekends, please contact the on-call GI fellow at [phone]

## 2023-02-14 DIAGNOSIS — E87.1 HYPO-OSMOLALITY AND HYPONATREMIA: ICD-10-CM

## 2023-02-14 DIAGNOSIS — U07.1 COVID-19: ICD-10-CM

## 2023-02-14 LAB
ALBUMIN SERPL ELPH-MCNC: 3.8 G/DL — SIGNIFICANT CHANGE UP (ref 3.3–5)
ALP SERPL-CCNC: 69 U/L — SIGNIFICANT CHANGE UP (ref 40–120)
ALT FLD-CCNC: 23 U/L — SIGNIFICANT CHANGE UP (ref 4–41)
ANION GAP SERPL CALC-SCNC: 10 MMOL/L — SIGNIFICANT CHANGE UP (ref 7–14)
AST SERPL-CCNC: 14 U/L — SIGNIFICANT CHANGE UP (ref 4–40)
BASOPHILS # BLD AUTO: 0.02 K/UL — SIGNIFICANT CHANGE UP (ref 0–0.2)
BASOPHILS NFR BLD AUTO: 0.2 % — SIGNIFICANT CHANGE UP (ref 0–2)
BILIRUB SERPL-MCNC: 1.9 MG/DL — HIGH (ref 0.2–1.2)
BUN SERPL-MCNC: 8 MG/DL — SIGNIFICANT CHANGE UP (ref 7–23)
CALCIUM SERPL-MCNC: 9 MG/DL — SIGNIFICANT CHANGE UP (ref 8.4–10.5)
CHLORIDE SERPL-SCNC: 95 MMOL/L — LOW (ref 98–107)
CO2 SERPL-SCNC: 26 MMOL/L — SIGNIFICANT CHANGE UP (ref 22–31)
CREAT SERPL-MCNC: 0.81 MG/DL — SIGNIFICANT CHANGE UP (ref 0.5–1.3)
EGFR: 123 ML/MIN/1.73M2 — SIGNIFICANT CHANGE UP
EOSINOPHIL # BLD AUTO: 0.04 K/UL — SIGNIFICANT CHANGE UP (ref 0–0.5)
EOSINOPHIL NFR BLD AUTO: 0.3 % — SIGNIFICANT CHANGE UP (ref 0–6)
GLUCOSE SERPL-MCNC: 92 MG/DL — SIGNIFICANT CHANGE UP (ref 70–99)
HCT VFR BLD CALC: 46.2 % — SIGNIFICANT CHANGE UP (ref 39–50)
HGB BLD-MCNC: 15.8 G/DL — SIGNIFICANT CHANGE UP (ref 13–17)
IANC: 9.52 K/UL — HIGH (ref 1.8–7.4)
IMM GRANULOCYTES NFR BLD AUTO: 0.4 % — SIGNIFICANT CHANGE UP (ref 0–0.9)
LYMPHOCYTES # BLD AUTO: 1.35 K/UL — SIGNIFICANT CHANGE UP (ref 1–3.3)
LYMPHOCYTES # BLD AUTO: 11.2 % — LOW (ref 13–44)
MAGNESIUM SERPL-MCNC: 2 MG/DL — SIGNIFICANT CHANGE UP (ref 1.6–2.6)
MCHC RBC-ENTMCNC: 30 PG — SIGNIFICANT CHANGE UP (ref 27–34)
MCHC RBC-ENTMCNC: 34.2 GM/DL — SIGNIFICANT CHANGE UP (ref 32–36)
MCV RBC AUTO: 87.7 FL — SIGNIFICANT CHANGE UP (ref 80–100)
MONOCYTES # BLD AUTO: 1.07 K/UL — HIGH (ref 0–0.9)
MONOCYTES NFR BLD AUTO: 8.9 % — SIGNIFICANT CHANGE UP (ref 2–14)
NEUTROPHILS # BLD AUTO: 9.52 K/UL — HIGH (ref 1.8–7.4)
NEUTROPHILS NFR BLD AUTO: 79 % — HIGH (ref 43–77)
NRBC # BLD: 0 /100 WBCS — SIGNIFICANT CHANGE UP (ref 0–0)
NRBC # FLD: 0 K/UL — SIGNIFICANT CHANGE UP (ref 0–0)
PHOSPHATE SERPL-MCNC: 3.2 MG/DL — SIGNIFICANT CHANGE UP (ref 2.5–4.5)
PLATELET # BLD AUTO: 325 K/UL — SIGNIFICANT CHANGE UP (ref 150–400)
POTASSIUM SERPL-MCNC: 4.3 MMOL/L — SIGNIFICANT CHANGE UP (ref 3.5–5.3)
POTASSIUM SERPL-SCNC: 4.3 MMOL/L — SIGNIFICANT CHANGE UP (ref 3.5–5.3)
PROT SERPL-MCNC: 7 G/DL — SIGNIFICANT CHANGE UP (ref 6–8.3)
RBC # BLD: 5.27 M/UL — SIGNIFICANT CHANGE UP (ref 4.2–5.8)
RBC # FLD: 13.1 % — SIGNIFICANT CHANGE UP (ref 10.3–14.5)
SODIUM SERPL-SCNC: 131 MMOL/L — LOW (ref 135–145)
WBC # BLD: 12.05 K/UL — HIGH (ref 3.8–10.5)
WBC # FLD AUTO: 12.05 K/UL — HIGH (ref 3.8–10.5)

## 2023-02-14 PROCEDURE — 99232 SBSQ HOSP IP/OBS MODERATE 35: CPT

## 2023-02-14 RX ORDER — ENOXAPARIN SODIUM 100 MG/ML
40 INJECTION SUBCUTANEOUS EVERY 24 HOURS
Refills: 0 | Status: DISCONTINUED | OUTPATIENT
Start: 2023-02-14 | End: 2023-02-15

## 2023-02-14 RX ORDER — PANTOPRAZOLE SODIUM 20 MG/1
40 TABLET, DELAYED RELEASE ORAL
Refills: 0 | Status: DISCONTINUED | OUTPATIENT
Start: 2023-02-14 | End: 2023-02-15

## 2023-02-14 RX ADMIN — HYDROMORPHONE HYDROCHLORIDE 1 MILLIGRAM(S): 2 INJECTION INTRAMUSCULAR; INTRAVENOUS; SUBCUTANEOUS at 16:27

## 2023-02-14 RX ADMIN — ENOXAPARIN SODIUM 40 MILLIGRAM(S): 100 INJECTION SUBCUTANEOUS at 16:27

## 2023-02-14 RX ADMIN — HYDROMORPHONE HYDROCHLORIDE 1 MILLIGRAM(S): 2 INJECTION INTRAMUSCULAR; INTRAVENOUS; SUBCUTANEOUS at 06:20

## 2023-02-14 RX ADMIN — SODIUM CHLORIDE 150 MILLILITER(S): 9 INJECTION, SOLUTION INTRAVENOUS at 05:33

## 2023-02-14 RX ADMIN — Medication 200 MILLIGRAM(S): at 16:27

## 2023-02-14 RX ADMIN — HYDROMORPHONE HYDROCHLORIDE 1 MILLIGRAM(S): 2 INJECTION INTRAMUSCULAR; INTRAVENOUS; SUBCUTANEOUS at 16:45

## 2023-02-14 RX ADMIN — SODIUM CHLORIDE 150 MILLILITER(S): 9 INJECTION, SOLUTION INTRAVENOUS at 19:53

## 2023-02-14 RX ADMIN — HYDROMORPHONE HYDROCHLORIDE 1 MILLIGRAM(S): 2 INJECTION INTRAMUSCULAR; INTRAVENOUS; SUBCUTANEOUS at 05:33

## 2023-02-14 RX ADMIN — SODIUM CHLORIDE 150 MILLILITER(S): 9 INJECTION, SOLUTION INTRAVENOUS at 16:27

## 2023-02-14 NOTE — PROGRESS NOTE ADULT - NS ATTEND AMEND GEN_ALL_CORE FT
#Acute pancreatitis: Presented with abdominal pain with associated lipase elevation c/w acute pancreatitis. No significant EtOH reported. TG levels normal. US without biliary pathology. No reported medications/OTC supplements. Slow clinical improvement with supportive care.   #COVID infection    --Daily PPI  --Pain management per primary team  --Can trial clear liquid diet when symptoms as symptoms improve and slowly advance as tolerated  --Follow up IgG4  --PEth testing  --May consider EUS as outpatient if workup for underlying etiology unrevealing    Additional recommendations as above.

## 2023-02-14 NOTE — PROGRESS NOTE ADULT - ASSESSMENT
28M w/ no reported PMH p/w with acute epigastric abd pain since 9pm last night after eating [employer] sandwich. Admitted for acute pancreatitis, GI consulted for further evaluation.    #acute pancreatitis  #abdominal pain  #covid infection  -p/w severe epigastric pain w/ associated n/v since last night after eating sandwich, initial lipase >3000, mild elevation in wbc, renal fxn normal, tg/lfts/ca wnl, us/ct pending, fhx n/c, no daily meds, social etoh use but denies recent use  2/14- mild improvement in pain, no gs seen on US, CTAP w/ acute edematous pancreatitis    Recommendations-  -continue supportive management  -NPO, IVF resuscitation  -PPI ppx daily  -pain control, anti emetics prn (if qtc ok/no medical CI)   -check Igg4 to r/o any underlying AI etiology  -fractionate tbili, trend lfts daily  -if pain continues to improve, rec trial of clears later today as tolerated  -rest of care as per primary team    please reach to GI team with any further questions/concerns  *all recommendations are tentative until note is attested by attending*    MARLENE Mullins PA-C, RD, CDN  available on TEAMS for questions    after 5pm/weekends, please contact the on-call GI fellow at [phone]  28M w/ no reported PMH p/w with acute epigastric abd pain since 9pm last night after eating [employer] sandwich. Admitted for acute pancreatitis, GI consulted for further evaluation.    #acute pancreatitis  #abdominal pain  #covid infection  -p/w severe epigastric pain w/ associated n/v since last night after eating sandwich, initial lipase >3000, mild elevation in wbc, renal fxn normal, tg/lfts/ca wnl, us/ct pending, fhx n/c, no daily meds, social etoh use but denies recent use  2/14- mild improvement in pain, no gs seen on US, CTAP w/ acute edematous pancreatitis    Recommendations-  -continue supportive management  -NPO, IVF resuscitation  -PPI ppx daily  -pain control, anti emetics prn (if qtc ok/no medical CI)   -check Igg4 to r/o any underlying AI etiology, check PEth   -fractionate tbili, trend lfts daily  -if pain continues to improve, rec trial of clears later today as tolerated  -rest of care as per primary team    please reach to GI team with any further questions/concerns  *all recommendations are tentative until note is attested by attending*    MARLENE Mullins PA-C, RD, CDN  available on TEAMS for questions    after 5pm/weekends, please contact the on-call GI fellow at [phone]

## 2023-02-14 NOTE — PROGRESS NOTE ADULT - PROBLEM SELECTOR PLAN 5
DVT ppx: Low VTE   Diet: NPO , advance diet as tolerated as above   Dispo: Pending medical course  plan of care d/w pt and family at bedside  case d/w ACP

## 2023-02-14 NOTE — PROGRESS NOTE ADULT - SUBJECTIVE AND OBJECTIVE BOX
Interval Events: pt seen and examined. covid+. reports mild improvement in pain. denies n/v/diarrhea/constipation. no acute overnight events per nursing      Allergies:  No Known Allergies      Hospital Medications:  HYDROmorphone  Injectable 1 milliGRAM(s) IV Push every 4 hours PRN  HYDROmorphone  Injectable 2 milliGRAM(s) IV Push every 4 hours PRN  lactated ringers. 1000 milliLiter(s) IV Continuous <Continuous>  pantoprazole    Tablet 40 milliGRAM(s) Oral before breakfast      ROS: As per HPI, otherwise 10-point ROS reviewed and negative.    Vital Signs:  Vital Signs Last 24 Hrs  T(C): 37.2 (14 Feb 2023 05:30), Max: 37.2 (14 Feb 2023 05:30)  T(F): 99 (14 Feb 2023 05:30), Max: 99 (14 Feb 2023 05:30)  HR: 99 (14 Feb 2023 05:30) (77 - 99)  BP: 111/79 (14 Feb 2023 05:30) (111/79 - 138/88)  BP(mean): --  RR: 17 (14 Feb 2023 05:30) (16 - 18)  SpO2: 95% (14 Feb 2023 05:30) (95% - 100%)    Parameters below as of 14 Feb 2023 05:30  Patient On (Oxygen Delivery Method): room air      Daily Height in cm: 180.34 (13 Feb 2023 20:50)    Daily         LABS:                        15.8   12.05 )-----------( 325      ( 14 Feb 2023 06:44 )             46.2     Mean Cell Volume: 87.7 fL (02-14-23 @ 06:44)    02-14    131<L>  |  95<L>  |  8   ----------------------------<  92  4.3   |  26  |  0.81    Ca    9.0      14 Feb 2023 06:44  Phos  3.2     02-14  Mg     2.00     02-14    TPro  7.0  /  Alb  3.8  /  TBili  1.9<H>  /  DBili  x   /  AST  14  /  ALT  23  /  AlkPhos  69  02-14    LIVER FUNCTIONS - ( 14 Feb 2023 06:44 )  Alb: 3.8 g/dL / Pro: 7.0 g/dL / ALK PHOS: 69 U/L / ALT: 23 U/L / AST: 14 U/L / GGT: x                                       15.8   12.05 )-----------( 325      ( 14 Feb 2023 06:44 )             46.2                         14.9   12.42 )-----------( 346      ( 13 Feb 2023 05:50 )             44.8                         16.6   12.69 )-----------( 384      ( 13 Feb 2023 01:15 )             49.2         PHYSICAL EXAM:   GENERAL: lying in bed, well-nourished, in nad  HEENT: NCAT, oropharynx clear, no mucosal ulcerations appreciated  EYES: anicteric sclerae, moist conjunctivae  NECK: trachea midline, FROM, neck supple  CHEST:  respirations even, non labored  ABDOMEN:  soft, ttp epigastric region wo rt/guarding,  non-distended  EXTREMITIES: WWP, no edema  SKIN:  warm/dry, no visible rash appreciated  PSYCH: A&O x 3  NEURO: no tremor or asterixis noted

## 2023-02-14 NOTE — PROGRESS NOTE ADULT - ASSESSMENT
28M w/ no PMH p/w acute pancreatitis.  28M w/ no PMH p/w severe ABD pain and  approx 3-4 episodes of NBNB emesis, found to have Lipase of 3000, admitted for  acute pancreatitis. Also found to be COVID Positive

## 2023-02-14 NOTE — PROGRESS NOTE ADULT - PROBLEM SELECTOR PLAN 3
DVT ppx: Low VTE   Diet: NPO , advance diet as tolerated   Dispo: Pending medical course WBC 12.69   Non-toxic appearing   Afebrile   Likely reactive, pt also Covid positive, asymptomatic , will CTM   - Monitor CBC

## 2023-02-14 NOTE — PROGRESS NOTE ADULT - PROBLEM SELECTOR PLAN 2
WBC 12.69   Non-toxic appearing   Afebrile   Likely reactive  - Monitor CBC pt also noted to be COVID +ve, pt afebrile, reports mild cough  will CTM  supportive care  pt had 2 vaccines, did not get booster  will hold Remdesivir and no indication for Dexa at this time as ppt not hypoxic  PRN Robitussin for cough

## 2023-02-14 NOTE — PROGRESS NOTE ADULT - PROBLEM SELECTOR PLAN 1
Acute pancreatitis since 9pm today   Lipase > 3000  NBNB emesis x4 today   - IVF   - Pain control with Hydromorphone 1 PRN q4 for mod, Hydromorphone 2 PRN for severe   - Npo for now, advance as tolerable   - Anti-emetics PRN (Check QTc)  - F/U CT A/P   - F/U RUQ  - Consider Auto-immune Pancreatitis as this is his second episode of pancreatitis/similiar presentation as Dx: Will emai lGI c/s pt presented with abd pain , n/v and found to have  Lipase > 3000  - IVF   - Pain control with Hydromorphone 1 PRN q4 for mod, Hydromorphone 2 PRN for severe   - Npo for now, will start clear Liquid diet today and  advance as tolerated   - Anti-emetics PRN (Check QTc)  -  CT A/P -shows Acute edematous pancreatitis.  - RUQ US unremarkable   - will check  Auto-immune Pancreatitis as this is his second episode of pancreatitis/similiar presentation as Dx: , will check PEth   - As per GI , can  consider EUS as outpatient if workup for underlying etiology unrevealing

## 2023-02-14 NOTE — PROGRESS NOTE ADULT - SUBJECTIVE AND OBJECTIVE BOX
Patient is a 28y old  Male who presents with a chief complaint of Pancreatitis (14 Feb 2023 09:29)      SUBJECTIVE / OVERNIGHT EVENTS:    MEDICATIONS  (STANDING):  lactated ringers. 1000 milliLiter(s) (150 mL/Hr) IV Continuous <Continuous>  pantoprazole    Tablet 40 milliGRAM(s) Oral before breakfast    MEDICATIONS  (PRN):  HYDROmorphone  Injectable 1 milliGRAM(s) IV Push every 4 hours PRN Moderate Pain (4 - 6)  HYDROmorphone  Injectable 2 milliGRAM(s) IV Push every 4 hours PRN Severe Pain (7 - 10)      Vital Signs Last 24 Hrs  T(C): 37.2 (14 Feb 2023 05:30), Max: 37.2 (14 Feb 2023 05:30)  T(F): 99 (14 Feb 2023 05:30), Max: 99 (14 Feb 2023 05:30)  HR: 99 (14 Feb 2023 05:30) (77 - 99)  BP: 111/79 (14 Feb 2023 05:30) (111/79 - 138/88)  BP(mean): --  RR: 17 (14 Feb 2023 05:30) (16 - 18)  SpO2: 95% (14 Feb 2023 05:30) (95% - 100%)    Parameters below as of 14 Feb 2023 05:30  Patient On (Oxygen Delivery Method): room air      CAPILLARY BLOOD GLUCOSE        I&O's Summary      PHYSICAL EXAM:  GENERAL: NAD, well-developed  HEAD:  Atraumatic, Normocephalic  EYES: EOMI, PERRLA, conjunctiva and sclera clear  NECK: Supple, No JVD  CHEST/LUNG: Clear to auscultation bilaterally; No wheeze  HEART: Regular rate and rhythm; No murmurs, rubs, or gallops  ABDOMEN: Soft, Nontender, Nondistended; Bowel sounds present  EXTREMITIES:  2+ Peripheral Pulses, No clubbing, cyanosis, or edema  PSYCH: AAOx3  NEUROLOGY: non-focal  SKIN: No rashes or lesions    LABS:                        15.8   12.05 )-----------( 325      ( 14 Feb 2023 06:44 )             46.2     02-14    131<L>  |  95<L>  |  8   ----------------------------<  92  4.3   |  26  |  0.81    Ca    9.0      14 Feb 2023 06:44  Phos  3.2     02-14  Mg     2.00     02-14    TPro  7.0  /  Alb  3.8  /  TBili  1.9<H>  /  DBili  x   /  AST  14  /  ALT  23  /  AlkPhos  69  02-14              RADIOLOGY & ADDITIONAL TESTS:    Imaging Personally Reviewed:    Consultant(s) Notes Reviewed:      Care Discussed with Consultants/Other Providers:   Patient is a 28y old  Male who presents with a chief complaint of Pancreatitis (14 Feb 2023 09:29)      SUBJECTIVE / OVERNIGHT EVENTS: patient seen and examined by bedside, pt still having abdominal pain , but nausea and vomiting resolved, denies headache, dizziness, SOB, CP, Palpitations ,     MEDICATIONS  (STANDING):  lactated ringers. 1000 milliLiter(s) (150 mL/Hr) IV Continuous <Continuous>  pantoprazole    Tablet 40 milliGRAM(s) Oral before breakfast    MEDICATIONS  (PRN):  HYDROmorphone  Injectable 1 milliGRAM(s) IV Push every 4 hours PRN Moderate Pain (4 - 6)  HYDROmorphone  Injectable 2 milliGRAM(s) IV Push every 4 hours PRN Severe Pain (7 - 10)      Vital Signs Last 24 Hrs  T(C): 37.2 (14 Feb 2023 05:30), Max: 37.2 (14 Feb 2023 05:30)  T(F): 99 (14 Feb 2023 05:30), Max: 99 (14 Feb 2023 05:30)  HR: 99 (14 Feb 2023 05:30) (77 - 99)  BP: 111/79 (14 Feb 2023 05:30) (111/79 - 138/88)  BP(mean): --  RR: 17 (14 Feb 2023 05:30) (16 - 18)  SpO2: 95% (14 Feb 2023 05:30) (95% - 100%)    Parameters below as of 14 Feb 2023 05:30  Patient On (Oxygen Delivery Method): room air      CAPILLARY BLOOD GLUCOSE        I&O's Summary      PHYSICAL EXAM:  GENERAL: NAD, well-developed  HEAD:  Atraumatic, Normocephalic  EYES: EOMI, PERRLA, conjunctiva and sclera clear  NECK: Supple, No JVD  CHEST/LUNG: Clear to auscultation bilaterally; No wheeze  HEART: Regular rate and rhythm; No murmurs, rubs, or gallops  ABDOMEN: Soft, mild epigastric tenderness , Nondistended; Bowel sounds present  EXTREMITIES:  2+ Peripheral Pulses, No clubbing, cyanosis, or edema  PSYCH: AAOx3  NEUROLOGY: non-focal  SKIN: No rashes or lesions    LABS:                        15.8   12.05 )-----------( 325      ( 14 Feb 2023 06:44 )             46.2     02-14    131<L>  |  95<L>  |  8   ----------------------------<  92  4.3   |  26  |  0.81    Ca    9.0      14 Feb 2023 06:44  Phos  3.2     02-14  Mg     2.00     02-14    TPro  7.0  /  Alb  3.8  /  TBili  1.9<H>  /  DBili  x   /  AST  14  /  ALT  23  /  AlkPhos  69  02-14              RADIOLOGY & ADDITIONAL TESTS:  < from: CT Abdomen and Pelvis w/ IV Cont (02.13.23 @ 09:36) >  INDINGS:  LOWER CHEST: Trace left basilar subsegmental atelectasis.    LIVER: Within normal limits.  BILE DUCTS: Normal caliber.  GALLBLADDER: Within normal limits.  SPLEEN: Within normal limits.  PANCREAS: Diffuse enlargement of the pancreas with peripancreatic   stranding and edema consistent with acute edematous pancreatitis. Trace   fluid extends along the bilateral anterior pararenal spaces.  ADRENALS: Within normal limits.  KIDNEYS/URETERS: Within normal limits.    BLADDER: Within normal limits.  REPRODUCTIVE ORGANS: Prostate within normal limits.    BOWEL: No bowel obstruction. Appendix is normal.  PERITONEUM: No ascites.  VESSELS: Within normal limits.  RETROPERITONEUM/LYMPH NODES: No lymphadenopathy.  ABDOMINAL WALL: Within normal limits.  BONES: Within normal limits.    IMPRESSION:  Acute edematous pancreatitis.    < end of copied text >  < from: US Abdomen Upper Quadrant Right (02.13.23 @ 03:59) >  FINDINGS:  Liver: Within normal limits.  Bile ducts: Normal caliber. Common bile duct measures 4 mm.  Gallbladder: Within normal limits.  Pancreas: Poorly visualized.  Right kidney: 10.9 cm. No hydronephrosis.  Ascites: None.  IVC: Visualized portions are within normal limits.    IMPRESSION:  Normal right upper quadrant abdominal ultrasound.      < end of copied text >    Imaging Personally Reviewed:    Consultant(s) Notes Reviewed:      Care Discussed with Consultants/Other Providers:

## 2023-02-15 VITALS
OXYGEN SATURATION: 97 % | SYSTOLIC BLOOD PRESSURE: 111 MMHG | DIASTOLIC BLOOD PRESSURE: 60 MMHG | RESPIRATION RATE: 19 BRPM | HEART RATE: 96 BPM | TEMPERATURE: 98 F

## 2023-02-15 LAB
ALBUMIN SERPL ELPH-MCNC: 3.6 G/DL — SIGNIFICANT CHANGE UP (ref 3.3–5)
ALP SERPL-CCNC: 65 U/L — SIGNIFICANT CHANGE UP (ref 40–120)
ALT FLD-CCNC: 22 U/L — SIGNIFICANT CHANGE UP (ref 4–41)
ANION GAP SERPL CALC-SCNC: 10 MMOL/L — SIGNIFICANT CHANGE UP (ref 7–14)
AST SERPL-CCNC: 13 U/L — SIGNIFICANT CHANGE UP (ref 4–40)
BASOPHILS # BLD AUTO: 0.03 K/UL — SIGNIFICANT CHANGE UP (ref 0–0.2)
BASOPHILS NFR BLD AUTO: 0.3 % — SIGNIFICANT CHANGE UP (ref 0–2)
BILIRUB SERPL-MCNC: 2.3 MG/DL — HIGH (ref 0.2–1.2)
BUN SERPL-MCNC: 8 MG/DL — SIGNIFICANT CHANGE UP (ref 7–23)
CALCIUM SERPL-MCNC: 9 MG/DL — SIGNIFICANT CHANGE UP (ref 8.4–10.5)
CHLORIDE SERPL-SCNC: 95 MMOL/L — LOW (ref 98–107)
CO2 SERPL-SCNC: 29 MMOL/L — SIGNIFICANT CHANGE UP (ref 22–31)
CREAT SERPL-MCNC: 0.9 MG/DL — SIGNIFICANT CHANGE UP (ref 0.5–1.3)
EGFR: 119 ML/MIN/1.73M2 — SIGNIFICANT CHANGE UP
EOSINOPHIL # BLD AUTO: 0.11 K/UL — SIGNIFICANT CHANGE UP (ref 0–0.5)
EOSINOPHIL NFR BLD AUTO: 1.1 % — SIGNIFICANT CHANGE UP (ref 0–6)
GLUCOSE SERPL-MCNC: 71 MG/DL — SIGNIFICANT CHANGE UP (ref 70–99)
HCT VFR BLD CALC: 44.5 % — SIGNIFICANT CHANGE UP (ref 39–50)
HGB BLD-MCNC: 14.6 G/DL — SIGNIFICANT CHANGE UP (ref 13–17)
IANC: 6.29 K/UL — SIGNIFICANT CHANGE UP (ref 1.8–7.4)
IMM GRANULOCYTES NFR BLD AUTO: 0.3 % — SIGNIFICANT CHANGE UP (ref 0–0.9)
LYMPHOCYTES # BLD AUTO: 2.45 K/UL — SIGNIFICANT CHANGE UP (ref 1–3.3)
LYMPHOCYTES # BLD AUTO: 24.5 % — SIGNIFICANT CHANGE UP (ref 13–44)
MAGNESIUM SERPL-MCNC: 1.9 MG/DL — SIGNIFICANT CHANGE UP (ref 1.6–2.6)
MCHC RBC-ENTMCNC: 29.1 PG — SIGNIFICANT CHANGE UP (ref 27–34)
MCHC RBC-ENTMCNC: 32.8 GM/DL — SIGNIFICANT CHANGE UP (ref 32–36)
MCV RBC AUTO: 88.8 FL — SIGNIFICANT CHANGE UP (ref 80–100)
MONOCYTES # BLD AUTO: 1.07 K/UL — HIGH (ref 0–0.9)
MONOCYTES NFR BLD AUTO: 10.7 % — SIGNIFICANT CHANGE UP (ref 2–14)
NEUTROPHILS # BLD AUTO: 6.29 K/UL — SIGNIFICANT CHANGE UP (ref 1.8–7.4)
NEUTROPHILS NFR BLD AUTO: 63.1 % — SIGNIFICANT CHANGE UP (ref 43–77)
NRBC # BLD: 0 /100 WBCS — SIGNIFICANT CHANGE UP (ref 0–0)
NRBC # FLD: 0 K/UL — SIGNIFICANT CHANGE UP (ref 0–0)
PHOSPHATE SERPL-MCNC: 2.5 MG/DL — SIGNIFICANT CHANGE UP (ref 2.5–4.5)
PLATELET # BLD AUTO: 274 K/UL — SIGNIFICANT CHANGE UP (ref 150–400)
POTASSIUM SERPL-MCNC: 3.8 MMOL/L — SIGNIFICANT CHANGE UP (ref 3.5–5.3)
POTASSIUM SERPL-SCNC: 3.8 MMOL/L — SIGNIFICANT CHANGE UP (ref 3.5–5.3)
PROT SERPL-MCNC: 6.9 G/DL — SIGNIFICANT CHANGE UP (ref 6–8.3)
RBC # BLD: 5.01 M/UL — SIGNIFICANT CHANGE UP (ref 4.2–5.8)
RBC # FLD: 13 % — SIGNIFICANT CHANGE UP (ref 10.3–14.5)
SODIUM SERPL-SCNC: 134 MMOL/L — LOW (ref 135–145)
WBC # BLD: 9.98 K/UL — SIGNIFICANT CHANGE UP (ref 3.8–10.5)
WBC # FLD AUTO: 9.98 K/UL — SIGNIFICANT CHANGE UP (ref 3.8–10.5)

## 2023-02-15 PROCEDURE — 99239 HOSP IP/OBS DSCHRG MGMT >30: CPT

## 2023-02-15 RX ADMIN — HYDROMORPHONE HYDROCHLORIDE 1 MILLIGRAM(S): 2 INJECTION INTRAMUSCULAR; INTRAVENOUS; SUBCUTANEOUS at 08:37

## 2023-02-15 RX ADMIN — SODIUM CHLORIDE 150 MILLILITER(S): 9 INJECTION, SOLUTION INTRAVENOUS at 02:02

## 2023-02-15 RX ADMIN — SODIUM CHLORIDE 150 MILLILITER(S): 9 INJECTION, SOLUTION INTRAVENOUS at 08:37

## 2023-02-15 RX ADMIN — HYDROMORPHONE HYDROCHLORIDE 1 MILLIGRAM(S): 2 INJECTION INTRAMUSCULAR; INTRAVENOUS; SUBCUTANEOUS at 09:00

## 2023-02-15 RX ADMIN — PANTOPRAZOLE SODIUM 40 MILLIGRAM(S): 20 TABLET, DELAYED RELEASE ORAL at 06:09

## 2023-02-15 RX ADMIN — HYDROMORPHONE HYDROCHLORIDE 1 MILLIGRAM(S): 2 INJECTION INTRAMUSCULAR; INTRAVENOUS; SUBCUTANEOUS at 00:18

## 2023-02-15 RX ADMIN — HYDROMORPHONE HYDROCHLORIDE 1 MILLIGRAM(S): 2 INJECTION INTRAMUSCULAR; INTRAVENOUS; SUBCUTANEOUS at 00:35

## 2023-02-15 NOTE — PROGRESS NOTE ADULT - PROBLEM SELECTOR PLAN 4
Na 131, pt asymptomatic, may be secondary to  pain and NPO status   will CTM Na 131, pt asymptomatic, may be secondary to  pain and NPO status    Na 134 today

## 2023-02-15 NOTE — PROGRESS NOTE ADULT - PROBLEM SELECTOR PLAN 3
WBC 12.69   Non-toxic appearing   Afebrile   Likely reactive, pt also Covid positive, asymptomatic , will CTM   - Monitor CBC WBC 12.69   Non-toxic appearing   Afebrile   Likely reactive, pt also Covid positive, asymptomatic   ,-resolved

## 2023-02-15 NOTE — DISCHARGE NOTE NURSING/CASE MANAGEMENT/SOCIAL WORK - NSDCPEFALRISK_GEN_ALL_CORE
For information on Fall & Injury Prevention, visit: [email or web address] OR  [email or web address] OR  https://www.cdc.gov/steadi/patient.html

## 2023-02-15 NOTE — PROGRESS NOTE ADULT - PROBLEM SELECTOR PLAN 5
DVT ppx: Low VTE   Diet: NPO , advance diet as tolerated as above   Dispo: Pending medical course  plan of care d/w pt and family at bedside  case d/w ACP DVT ppx: Low VTE   Diet: pt tolerating clear liquid diet, will advance to regular, if tolerates will plan for DC home   Dispo: Pending medical course  plan of care d/w pt and family at bedside  case d/w ACP

## 2023-02-15 NOTE — DISCHARGE NOTE PROVIDER - NSDCCPCAREPLAN_GEN_ALL_CORE_FT
PRINCIPAL DISCHARGE DIAGNOSIS  Diagnosis: Pancreatitis  Assessment and Plan of Treatment:       SECONDARY DISCHARGE DIAGNOSES  Diagnosis: 2019 novel coronavirus disease (COVID-19)  Assessment and Plan of Treatment:      PRINCIPAL DISCHARGE DIAGNOSIS  Diagnosis: Pancreatitis  Assessment and Plan of Treatment: you were admitted with Pancreatitis, You were seen by Gastroenterology . Please follow uo with Gastrenterology for pending labs and further management  for etiology of pancreatitis . PLease avoid alcohol use      SECONDARY DISCHARGE DIAGNOSES  Diagnosis: 2019 novel coronavirus disease (COVID-19)  Assessment and Plan of Treatment: You have been diagnosed with the COVID-19 virus during your hospital stay.  Monitor for fevers, shortness of breath and cough.  If any of these symptoms worsen, report back to the emergency room.    It has been determined that you no longer need hospitalization and can recover while remaining in self-quarantine at home.

## 2023-02-15 NOTE — DISCHARGE NOTE NURSING/CASE MANAGEMENT/SOCIAL WORK - PATIENT PORTAL LINK FT
You can access the FollowMyHealth Patient Portal offered by [facility] by registering at the following website: [email or web address]. By joining [employer]’s FollowMyHealth portal, you will also be able to view your health information using other applications (apps) compatible with our system.

## 2023-02-15 NOTE — DISCHARGE NOTE PROVIDER - CARE PROVIDER_API CALL
Vilsaint, Danielle (NP; RN)  NP in Family Health  [address], [address]  [place] [id number]  Phone: [phone]  Fax: [phone]  Follow Up Time: 1 week

## 2023-02-15 NOTE — PROGRESS NOTE ADULT - ASSESSMENT
28M w/ no PMH p/w severe ABD pain and  approx 3-4 episodes of NBNB emesis, found to have Lipase of 3000, admitted for  acute pancreatitis. Also found to be COVID Positive

## 2023-02-15 NOTE — PROGRESS NOTE ADULT - PROBLEM SELECTOR PLAN 1
pt presented with abd pain , n/v and found to have  Lipase > 3000  - IVF   - Pain control with Hydromorphone 1 PRN q4 for mod, Hydromorphone 2 PRN for severe   - Npo for now, will start clear Liquid diet today and  advance as tolerated   - Anti-emetics PRN (Check QTc)  -  CT A/P -shows Acute edematous pancreatitis.  - RUQ US unremarkable   - will check  Auto-immune Pancreatitis as this is his second episode of pancreatitis/similiar presentation as Dx: , will check PEth   - As per GI , can  consider EUS as outpatient if workup for underlying etiology unrevealing pt presented with abd pain , n/v and found to have  Lipase > 3000  - IVF   - Pain control with Hydromorphone 1 PRN q4 for mod, Hydromorphone 2 PRN for severe   - tolerating  clear Liquid diet will advance to regular today    - Anti-emetics PRN (Check QTc)  -  CT A/P -shows Acute edematous pancreatitis.  - RUQ US unremarkable   - will check  Auto-immune Pancreatitis as this is his second episode of pancreatitis/similiar presentation as Dx: , will check PEth , f/u IG G4 and PEth , (testing in lab)   - As per GI , can  consider EUS as outpatient if workup for underlying etiology unrevealing  pt can f/u with GI for pending labs and outpt EUS if indicated per GI

## 2023-02-15 NOTE — PROGRESS NOTE ADULT - SUBJECTIVE AND OBJECTIVE BOX
Patient is a 28y old  Male who presents with a chief complaint of Pancreatitis (14 Feb 2023 10:01)      SUBJECTIVE / OVERNIGHT EVENTS:    MEDICATIONS  (STANDING):  enoxaparin Injectable 40 milliGRAM(s) SubCutaneous every 24 hours  lactated ringers. 1000 milliLiter(s) (150 mL/Hr) IV Continuous <Continuous>  pantoprazole    Tablet 40 milliGRAM(s) Oral before breakfast    MEDICATIONS  (PRN):  guaiFENesin Oral Liquid (Sugar-Free) 200 milliGRAM(s) Oral every 6 hours PRN Cough  HYDROmorphone  Injectable 1 milliGRAM(s) IV Push every 4 hours PRN Moderate Pain (4 - 6)  HYDROmorphone  Injectable 2 milliGRAM(s) IV Push every 4 hours PRN Severe Pain (7 - 10)      Vital Signs Last 24 Hrs  T(C): 37.1 (15 Feb 2023 05:23), Max: 37.1 (15 Feb 2023 05:23)  T(F): 98.7 (15 Feb 2023 05:23), Max: 98.7 (15 Feb 2023 05:23)  HR: 92 (15 Feb 2023 05:23) (77 - 92)  BP: 105/58 (15 Feb 2023 05:23) (104/55 - 117/81)  BP(mean): --  RR: 18 (15 Feb 2023 05:23) (18 - 18)  SpO2: 97% (15 Feb 2023 05:23) (97% - 98%)    Parameters below as of 15 Feb 2023 05:23  Patient On (Oxygen Delivery Method): room air      CAPILLARY BLOOD GLUCOSE        I&O's Summary      PHYSICAL EXAM:  GENERAL: NAD, well-developed  HEAD:  Atraumatic, Normocephalic  EYES: EOMI, PERRLA, conjunctiva and sclera clear  NECK: Supple, No JVD  CHEST/LUNG: Clear to auscultation bilaterally; No wheeze  HEART: Regular rate and rhythm; No murmurs, rubs, or gallops  ABDOMEN: Soft, Nontender, Nondistended; Bowel sounds present  EXTREMITIES:  2+ Peripheral Pulses, No clubbing, cyanosis, or edema  PSYCH: AAOx3  NEUROLOGY: non-focal  SKIN: No rashes or lesions    LABS:                        14.6   9.98  )-----------( 274      ( 15 Feb 2023 05:39 )             44.5     02-15    134<L>  |  95<L>  |  8   ----------------------------<  71  3.8   |  29  |  0.90    Ca    9.0      15 Feb 2023 05:39  Phos  2.5     02-15  Mg     1.90     02-15    TPro  6.9  /  Alb  3.6  /  TBili  2.3<H>  /  DBili  x   /  AST  13  /  ALT  22  /  AlkPhos  65  02-15              RADIOLOGY & ADDITIONAL TESTS:    Imaging Personally Reviewed:    Consultant(s) Notes Reviewed:      Care Discussed with Consultants/Other Providers:   Patient is a 28y old  Male who presents with a chief complaint of Pancreatitis (14 Feb 2023 10:01)      SUBJECTIVE / OVERNIGHT EVENTS: patient seen and examined by bedside, pt feeling better , abdominal pain improved, nausea and vomiting resolved, pt has very mild cough , denies headache, dizziness, SOB, CP, Palpitations ,                                                  MEDICATIONS  (STANDING):  enoxaparin Injectable 40 milliGRAM(s) SubCutaneous every 24 hours  lactated ringers. 1000 milliLiter(s) (150 mL/Hr) IV Continuous <Continuous>  pantoprazole    Tablet 40 milliGRAM(s) Oral before breakfast    MEDICATIONS  (PRN):  guaiFENesin Oral Liquid (Sugar-Free) 200 milliGRAM(s) Oral every 6 hours PRN Cough  HYDROmorphone  Injectable 1 milliGRAM(s) IV Push every 4 hours PRN Moderate Pain (4 - 6)  HYDROmorphone  Injectable 2 milliGRAM(s) IV Push every 4 hours PRN Severe Pain (7 - 10)      Vital Signs Last 24 Hrs  T(C): 37.1 (15 Feb 2023 05:23), Max: 37.1 (15 Feb 2023 05:23)  T(F): 98.7 (15 Feb 2023 05:23), Max: 98.7 (15 Feb 2023 05:23)  HR: 92 (15 Feb 2023 05:23) (77 - 92)  BP: 105/58 (15 Feb 2023 05:23) (104/55 - 117/81)  BP(mean): --  RR: 18 (15 Feb 2023 05:23) (18 - 18)  SpO2: 97% (15 Feb 2023 05:23) (97% - 98%)    Parameters below as of 15 Feb 2023 05:23  Patient On (Oxygen Delivery Method): room air      CAPILLARY BLOOD GLUCOSE        I&O's Summary      PHYSICAL EXAM:  GENERAL: NAD, well-developed  HEAD:  Atraumatic, Normocephalic  EYES: EOMI, PERRLA, conjunctiva and sclera clear  NECK: Supple, No JVD  CHEST/LUNG: Clear to auscultation bilaterally; No wheeze  HEART: Regular rate and rhythm; No murmurs, rubs, or gallops  ABDOMEN: Soft, Nontender, Nondistended; Bowel sounds present  EXTREMITIES:  2+ Peripheral Pulses, No clubbing, cyanosis, or edema  PSYCH: AAOx3  NEUROLOGY: non-focal  SKIN: No rashes or lesions    LABS:                        14.6   9.98  )-----------( 274      ( 15 Feb 2023 05:39 )             44.5     02-15    134<L>  |  95<L>  |  8   ----------------------------<  71  3.8   |  29  |  0.90    Ca    9.0      15 Feb 2023 05:39  Phos  2.5     02-15  Mg     1.90     02-15    TPro  6.9  /  Alb  3.6  /  TBili  2.3<H>  /  DBili  x   /  AST  13  /  ALT  22  /  AlkPhos  65  02-15              RADIOLOGY & ADDITIONAL TESTS:    Imaging Personally Reviewed:    Consultant(s) Notes Reviewed:      Care Discussed with Consultants/Other Providers:

## 2023-02-15 NOTE — PROGRESS NOTE ADULT - PROBLEM SELECTOR PLAN 2
pt also noted to be COVID +ve, pt afebrile, reports mild cough  will CTM  supportive care  pt had 2 vaccines, did not get booster  will hold Remdesivir and no indication for Dexa at this time as ppt not hypoxic  PRN Robitussin for cough

## 2023-02-15 NOTE — DISCHARGE NOTE PROVIDER - HOSPITAL COURSE
28 M w/ no PMH p/w severe ABD pain and  approx.  3-4 episodes of NBNB emesis, found to have Lipase of 3000, admitted for  acute pancreatitis. Also found to be COVID Positive     Acute pancreatitis.   : pt presented with abd pain , n/v and found to have  Lipase > 3000  - IVF   - Pain control with Hydromorphone 1 PRN q4 for mod, Hydromorphone 2 PRN for severe   - tolerating  clear Liquid diet will advance to regular    - Anti-emetics PRN (Check QTc)  -  CT A/P -shows Acute edematous pancreatitis.  - RUQ US unremarkable   - will check  Auto-immune Pancreatitis as this is his second episode of pancreatitis/similiar presentation as Dx: , will check PEth , f/u IG G4 and PEth , (testing in lab)   - As per GI , can  consider EUS as outpatient if workup for underlying etiology unrevealing  pt can f/u with GI for pending labs and outpt EUS if indicated per GI.     2019 novel coronavirus disease (COVID-19).    pt also noted to be COVID +ve, pt afebrile, reports mild cough  will CTM  supportive care  pt had 2 vaccines, did not get booster  will hold Remdesivir and no indication for Dexa at this time as ppt not hypoxic  PRN Robitussin for cough.     Leukocytosis.   ·  Plan: WBC 12.69   Non-toxic appearing   Afebrile   Likely reactive, pt also Covid positive, asymptomatic   ,-resolved.      : Hyponatremia.    Na 131, pt asymptomatic, may be secondary to  pain and NPO status    Na 134 today.

## 2023-02-15 NOTE — DISCHARGE NOTE PROVIDER - NSFOLLOWUPCLINICS_GEN_ALL_ED_FT
[facility]  Gastroenterology  [address] [place] [id number]  Phone: [phone]  Fax:   Follow Up Time: 1 week

## 2023-02-16 LAB — IGG4 SER-MCNC: 68 MG/DL — SIGNIFICANT CHANGE UP (ref 2–96)

## 2023-02-20 LAB — PHOSPHATIDYLETHANOL (PETH) - RESULT: 140 NG/ML — HIGH

## 2023-10-03 NOTE — ED PROVIDER NOTE - NS ED ATTENDING STATEMENT MOD
This was a shared visit with the MAN. I reviewed and verified the documentation and independently performed the documented: Burow's Graft Text: The defect edges were debeveled with a #15 scalpel blade. Given the location of the defect, shape of the defect, the proximity to free margins and the presence of a standing cone deformity a Burow's skin graft was deemed most appropriate. The standing cone was removed and this tissue was then trimmed to the shape of the primary defect. The adipose tissue was also removed until only dermis and epidermis were left.  The skin margins of the secondary defect were undermined to an appropriate distance in all directions utilizing iris scissors.  The secondary defect was closed with interrupted buried subcutaneous sutures.  The skin edges were then re-apposed with running  sutures.  The skin graft was then placed in the primary defect and oriented appropriately.

## 2024-04-06 NOTE — DISCHARGE NOTE NURSING/CASE MANAGEMENT/SOCIAL WORK - NSDCPEPTCAREGIVEDUMATLIST _GEN_ALL_CORE
Coronavirus/COVID19
Normal rate, regular rhythm.  Heart sounds S1, S2.  No murmurs, rubs or gallops.

## 2024-08-09 NOTE — ED PROVIDER NOTE - CPE EDP GASTRO NORM
Spoke to Cata Aditimonse's mom. In person consult appt scheduled with Dr. Avalos on Wednesday 09/04/2024 at 4:30pm. Instructions emailed per mom's request.    normal... PAST MEDICAL HISTORY:  Back pain     BPH (benign prostatic hypertrophy)     CAD (coronary artery disease) CARDIAC STENT X1    Chronic kidney disease (CKD)     Chronic pain syndrome     CRI (chronic renal insufficiency)     Deep vein thrombosis (DVT) BILAT    Diabetes     Gout     H/O edema     H/O kyphosis THORACOLUMBAR REGION    H/O: depression     History of TIAs 4/2024    HTN (hypertension)     Hyperlipidemia     PAF (paroxysmal atrial fibrillation)     Venous insufficiency      PAST MEDICAL HISTORY:  Back pain     BPH (benign prostatic hypertrophy)     CAD (coronary artery disease) CARDIAC STENT X1 2019    Chronic kidney disease (CKD)     Chronic pain syndrome     Deep vein thrombosis (DVT) BILAT 2024    Diabetes     Gout     H/O edema     H/O kyphosis THORACOLUMBAR REGION    H/O: depression     HTN (hypertension)     Hyperlipidemia     PAF (paroxysmal atrial fibrillation)     Venous insufficiency